# Patient Record
Sex: MALE | Race: BLACK OR AFRICAN AMERICAN | NOT HISPANIC OR LATINO | URBAN - METROPOLITAN AREA
[De-identification: names, ages, dates, MRNs, and addresses within clinical notes are randomized per-mention and may not be internally consistent; named-entity substitution may affect disease eponyms.]

---

## 2018-12-12 ENCOUNTER — AMBULATORY - HEALTHEAST (OUTPATIENT)
Dept: SLEEP MEDICINE | Facility: CLINIC | Age: 48
End: 2018-12-12

## 2018-12-12 DIAGNOSIS — G47.33 OSA (OBSTRUCTIVE SLEEP APNEA): ICD-10-CM

## 2019-03-21 ENCOUNTER — COMMUNICATION - HEALTHEAST (OUTPATIENT)
Dept: SLEEP MEDICINE | Facility: CLINIC | Age: 49
End: 2019-03-21

## 2019-03-21 ENCOUNTER — OFFICE VISIT - HEALTHEAST (OUTPATIENT)
Dept: SLEEP MEDICINE | Facility: CLINIC | Age: 49
End: 2019-03-21

## 2019-03-21 DIAGNOSIS — R29.818 SUSPECTED SLEEP APNEA: ICD-10-CM

## 2019-03-21 DIAGNOSIS — R06.83 SNORING: ICD-10-CM

## 2019-03-21 DIAGNOSIS — G47.10 HYPERSOMNIA: ICD-10-CM

## 2019-03-21 DIAGNOSIS — G47.8 SLEEP DYSFUNCTION WITH SLEEP STAGE DISTURBANCE: ICD-10-CM

## 2019-03-21 RX ORDER — MINOCYCLINE HYDROCHLORIDE 100 MG/1
100 CAPSULE ORAL 2 TIMES DAILY
Status: SHIPPED | COMMUNITY
Start: 2019-03-21

## 2019-03-21 RX ORDER — ATORVASTATIN CALCIUM 20 MG/1
20 TABLET, FILM COATED ORAL AT BEDTIME
Status: SHIPPED | COMMUNITY
Start: 2019-03-21

## 2019-03-21 ASSESSMENT — MIFFLIN-ST. JEOR: SCORE: 1942.16

## 2019-04-03 ENCOUNTER — AMBULATORY - HEALTHEAST (OUTPATIENT)
Dept: SLEEP MEDICINE | Facility: CLINIC | Age: 49
End: 2019-04-03

## 2019-04-04 ENCOUNTER — COMMUNICATION - HEALTHEAST (OUTPATIENT)
Dept: SLEEP MEDICINE | Facility: CLINIC | Age: 49
End: 2019-04-04

## 2019-04-04 DIAGNOSIS — G47.10 HYPERSOMNIA: ICD-10-CM

## 2019-04-04 DIAGNOSIS — G47.33 OBSTRUCTIVE SLEEP APNEA: ICD-10-CM

## 2019-06-05 ENCOUNTER — AMBULATORY - HEALTHEAST (OUTPATIENT)
Dept: SLEEP MEDICINE | Facility: CLINIC | Age: 49
End: 2019-06-05

## 2021-05-27 NOTE — TELEPHONE ENCOUNTER
I spoke to [facility] today. They will put a rush on these sleep study records and get them to us ASAP.

## 2021-05-27 NOTE — TELEPHONE ENCOUNTER
I spoke to [facility] department today. They state that they do not have any graphs to go along with interpretation. The records they sent over are the only records that they have in chart.

## 2021-05-27 NOTE — TELEPHONE ENCOUNTER
Please review the patient's records to see if it is sufficient to manuel the patient supplies. Thank you.

## 2021-05-27 NOTE — TELEPHONE ENCOUNTER
We were able to obtain the patient's original sleep study that was performed on 04/01/14 from [facility] [employer].  The patient's apnea hypopnea index was grossly elevated at 72.2 events per hour with the lowest O2 sat of 69%    Please call the patient scheduled for pickup of his CPAP machine.  The prescription is in the chart.  Thank you.

## 2021-05-27 NOTE — TELEPHONE ENCOUNTER
I looked through the notes that were faxed over today from [facility].  Even though they fax over progress note stating that he has obstructive sleep apnea.  The sleep study report and interpretation are not in this group.  Please contact [facility] again and ask specifically for the sleep study interpretation as well as report.  Thank you.

## 2021-05-27 NOTE — TELEPHONE ENCOUNTER
Sleep study records received today: 3/29/2019    From: [facility]    They have been sent to scan into patients chart, and a copy has been given to:  for review.

## 2021-05-27 NOTE — PROGRESS NOTES
Sleep study records received today: 4/3/2019    From: [facility]    They have been sent to scan into patients chart, and a copy has been given to: Dr. Cabrera  for review.

## 2021-05-29 NOTE — PROGRESS NOTES
Patient was offered choice of vendor and chose [facility].  Patient Alfredo Du was set up at [facility] on 6/5/19. Patient received a Resmed [id number] Auto. Pressures were set at 11-16.   Patient s ramp is 6 cm H2O for off and FLEX/EPR is EPR 2.  Patient received a REsmed Mask name: Airfit F20  FFM Size med, heated tubing and heated humidifier.    Pacee Her

## 2021-06-02 VITALS — HEIGHT: 67 IN | WEIGHT: 251 LBS | BODY MASS INDEX: 39.39 KG/M2

## 2021-06-25 NOTE — TELEPHONE ENCOUNTER
[facility]    Requesting sleep study records to include graphs and interpretations.      Fax number: [phone]    Confirmation received verifying successful.     Awaiting records

## 2021-06-25 NOTE — PATIENT INSTRUCTIONS - HE
Please call us to find out how soon we will be able to obtain the sleep study.    Equipment Instructions    We will process your PAP order and send it to a Durable Medical Equipment (DME) provider.    The medical equipment company should call you within 7 days.  If you have not heard from the company, please contact them to see if they received your order and are planning to call you.    Please call us at [phone] if you are unable to contact the medical equipment company or if they do not have the order.    If you are starting a new PAP machine, please call us after you use it the first night to let us know how it went. This call also helps us know that you received your equipment and that everything is ready. Please use our central phone number [phone]    Contact information for [employer] company:    -[employer] Tel: [phone]

## 2021-06-25 NOTE — PROGRESS NOTES
Dear Kinza Espinal Md  [address]  [address]  [place] [address]    Thank you for the opportunity to participate in the care of Mr. Alfredo Du.    He is a 49 y.o. male who comes to the clinic with a chief complaint of excessive daytime sleepiness that is been going on for more than 5 years.  He states that he was diagnosed with obstructive sleep apnea from [facility] approximately 5 years ago.  He was given a CPAP machine and was doing well on it.  However due to the fact that he was moving and recent incarceration, he lost his CPAP machine.  Patient was under the impression that he would be able to get a CPAP machine during this clinic visit.  After I informed him the process that we will need to follow through before he can get his CPAP machine, the patient was very upset with me, and wanted to leave.  I managed to de-escalate the situation and informed patient that we would do our best to try to obtain the original sleep study as soon as we can include release of information.  I also encouraged the patient to try to contact [facility] to obtain the sleep study himself.  Once we have the sleep study and hand I will write him a prescription to get a new CPAP machine.  Without CPAP therapy he reports that he still suffers from excessive daytime sleepiness, witnessed apnea as well as loud snoring.  Patient's review of systems is otherwise negative.     Ideal Sleep-Wake Cycle(devoid of societal pressure):    Patient would try to initiate sleep at around 1 AM with a sleep latency of 5 minutes. The patient would have variable awakenings. Final wake up time is around 6 AM.    Past Medical History  Past Medical History:   Diagnosis Date     Atrial flutter (H)      Chronic back pain      Hypertension      MIKE (obstructive sleep apnea)      Warfarin anticoagulation         Past Surgical History  No past surgical history on file.     Meds  Current Outpatient Medications   Medication Sig Dispense  Refill     amitriptyline (ELAVIL) 25 MG tablet Take 1 tablet by mouth daily.       aspirin 325 MG tablet Take 1 tablet by mouth daily.       atorvastatin (LIPITOR) 20 MG tablet Take 20 mg by mouth at bedtime.       DAILY-MARCIA tablet Take 1 tablet by mouth daily.       FLUoxetine (PROZAC) 20 MG capsule Take 20 mg by mouth daily.       fluticasone (FLONASE) 50 mcg/actuation nasal spray 2 sprays into each nostril daily.       GABAPENTIN ORAL Take by mouth.       hydrochlorothiazide (HYDRODIURIL) 25 MG tablet Take 25 mg by mouth daily.        ibuprofen (ADVIL,MOTRIN) 600 MG tablet as needed.        metoprolol (LOPRESSOR) 50 MG tablet Take 50 mg by mouth 2 (two) times a day.        minocycline (MINOCIN,DYNACIN) 100 MG capsule Take 100 mg by mouth 2 (two) times a day.       omeprazole (PRILOSEC) 20 MG capsule Take 1 capsule by mouth 2 (two) times a day.       traMADol (ULTRAM) 50 mg tablet Take 1 tablet (50 mg total) by mouth every 6 (six) hours as needed for pain. 10 tablet 0     No current facility-administered medications for this visit.         Allergies  Iodinated contrast- oral and iv dye     Social History  Social History     Socioeconomic History     Marital status:      Spouse name: Not on file     Number of children: Not on file     Years of education: Not on file     Highest education level: Not on file   Occupational History     Not on file   Social Needs     Financial resource strain: Not on file     Food insecurity:     Worry: Not on file     Inability: Not on file     Transportation needs:     Medical: Not on file     Non-medical: Not on file   Tobacco Use     Smoking status: Former Smoker     Smokeless tobacco: Never Used   Substance and Sexual Activity     Alcohol use: No     Drug use: No     Sexual activity: Not on file   Lifestyle     Physical activity:     Days per week: Not on file     Minutes per session: Not on file     Stress: Not on file   Relationships     Social connections:     Talks on  phone: Not on file     Gets together: Not on file     Attends Restorationism service: Not on file     Active member of club or organization: Not on file     Attends meetings of clubs or organizations: Not on file     Relationship status: Not on file     Intimate partner violence:     Fear of current or ex partner: Not on file     Emotionally abused: Not on file     Physically abused: Not on file     Forced sexual activity: Not on file   Other Topics Concern     Not on file   Social History Narrative     Not on file        Family History  No family history on file.     Review of Systems:  Constitutional: Negative except as noted in HPI.   Eyes: Negative except as noted in HPI.   ENT: Negative except as noted in HPI.   Cardiovascular: Negative except as noted in HPI.   Respiratory: Negative except as noted in HPI.   Gastrointestinal: Negative except as noted in HPI.   Genitourinary: Negative except as noted in HPI.   Musculoskeletal: Negative except as noted in HPI.   Integumentary: Negative except as noted in HPI.   Neurological: Negative except as noted in HPI.   Psychiatric: Negative except as noted in HPI.   Endocrine: Negative except as noted in HPI.   Hematologic/Lymphatic: Negative except as noted in HPI.      STOP BANG 3/21/2019   Do you snore loudly (louder than talking or loud enough to be heard through closed doors)? 1   Do you often feel tired, fatigued, or sleepy during daytime? 1   Has anyone observed you stop breathing in your sleep? 1   Do you have or are you being treated for high blood pressure? 1   BMI more than 35 kg/m2 1   Age over 50 years old? 1   Neck circumference greater than 16 inches? 1   Gender male? 1   Total Score 8     Epworths Sleepiness Scale 3/21/2019   Sitting and reading 2   Watching TV 3   Sitting, inactive in a public place (e.g. a theatre or a meeting) 3   As a passenger in a car for an hour without a break 3   Lying down to rest in the afternoon when circumstances permit 3   Sitting  "and talking to someone 2   Sitting quietly after a lunch without alcohol 3   In a car, while stopped for a few minutes in traffic 1   Total score 20     Rooming 3/21/2019   Sleep Latency anytime: quick   Wake Up Time 6am   Weekends varies   Energy Drinks 0   Coffee 0   Cola 0   Difficulty falling asleep No   Difficulty staying asleep Yes   Excessive daytime tiredness Yes   Excessive daytime sleepiness Yes   Dozing off while driving No   Shift Worker No   Sleep Walking? No   Sleep Talking? Yes   Kicking or punching? No   Restless legs symptoms Yes       Physical Exam:  /78   Pulse 62   Ht 5' 7\" (1.702 m)   Wt (!) 251 lb (113.9 kg)   SpO2 97%   BMI 39.31 kg/m    BMI:Body mass index is 39.31 kg/m .   GEN: NAD, obese  Head: Normocephalic.  EYES: PERRLA, EOMI  ENT: Oropharynx is clear, mallampatti class 4+ airway.   Nasal mucosa is moist without erythema  Neck : Thyroid is within normal limits.  Neck circumference 19.25 inches  CV: Regular rate and rhythm, S1 & S2 positive.  LUNGS: Bilateral breathsounds heard.   ABDOMEN: Positive bowel sounds in all quadrants, soft, no rebound or guarding  MUSCULOSKELETAL: Bilateral trace leg swelling  SKIN: warm, dry, no rashes  Neurological: Alert, oriented to time, place, and person.  Psych: normal mood, normal affect     Labs/Studies:     Lab Results   Component Value Date    WBC 5.6 07/18/2014    HGB 15.5 07/18/2014    HCT 47.3 07/18/2014    MCV 84 07/18/2014     07/18/2014         Chemistry        Component Value Date/Time     07/18/2014 2145    K 4.3 07/18/2014 2145     07/18/2014 2145    CO2 29 07/18/2014 2145    BUN 14 07/18/2014 2145    CREATININE 0.96 07/18/2014 2145     07/18/2014 2145        Component Value Date/Time    CALCIUM 10.0 07/18/2014 2145    ALKPHOS 107 07/18/2014 2145    AST 22 07/18/2014 2145    ALT 32 07/18/2014 2145    BILITOT 0.3 07/18/2014 2145            No results found for: FERRITIN  No results found for: TSH    "   Assessment and Plan:  In summary Alfredo Du is a 49 y.o. year old male here for sleep disturbance.  1.  Suspected sleep apnea  We will try to obtain the patient's original sleep study via release of information.  The patient is also encouraged to try to find the original sleep study himself and give us a copy.  Once we have a sleep study in the chart documenting the diagnosis of obstructive sleep apnea, I would be happy to write a prescription for the patient obtained CPAP therapy.  The patient wishes to use [employer] as the durable medical equipment company.  2.  Hypersomnia  3.  Snoring  4.  Other sleep disturbance    Patient verbalized understanding of these issues, agrees with the plan and all questions were answered today. Patient was given an opportuntity to voice any other symptoms or concerns not listed above. Patient did not have any other symptoms or concerns.       Ady Cabrera DO  Board Certified in Internal Medicine and Sleep Medicine  [facility].    (Note created with Dragon voice recognition and unintended spelling errors and word substitutions may occur)

## 2022-05-24 DIAGNOSIS — G47.33 OSA (OBSTRUCTIVE SLEEP APNEA): Primary | ICD-10-CM

## 2022-08-11 ENCOUNTER — OFFICE VISIT (OUTPATIENT)
Dept: SLEEP MEDICINE | Facility: CLINIC | Age: 52
End: 2022-08-11
Payer: COMMERCIAL

## 2022-08-11 VITALS
BODY MASS INDEX: 38.45 KG/M2 | OXYGEN SATURATION: 98 % | DIASTOLIC BLOOD PRESSURE: 78 MMHG | HEART RATE: 69 BPM | HEIGHT: 67 IN | WEIGHT: 245 LBS | SYSTOLIC BLOOD PRESSURE: 130 MMHG

## 2022-08-11 DIAGNOSIS — G47.33 OSA (OBSTRUCTIVE SLEEP APNEA): Primary | ICD-10-CM

## 2022-08-11 DIAGNOSIS — E66.9 OBESITY (BMI 30-39.9): ICD-10-CM

## 2022-08-11 PROCEDURE — 99203 OFFICE O/P NEW LOW 30 MIN: CPT | Performed by: NURSE PRACTITIONER

## 2022-08-11 RX ORDER — METFORMIN HCL 500 MG
500 TABLET, EXTENDED RELEASE 24 HR ORAL 2 TIMES DAILY WITH MEALS
COMMUNITY

## 2022-08-11 RX ORDER — MINOCYCLINE HYDROCHLORIDE 100 MG/1
100 CAPSULE ORAL
COMMUNITY
End: 2024-06-24

## 2022-08-11 RX ORDER — ATORVASTATIN CALCIUM 20 MG/1
20 TABLET, FILM COATED ORAL
COMMUNITY
End: 2024-06-24

## 2022-08-11 RX ORDER — METOPROLOL TARTRATE 50 MG
50 TABLET ORAL
COMMUNITY
End: 2024-06-22

## 2022-08-11 ASSESSMENT — SLEEP AND FATIGUE QUESTIONNAIRES
HOW LIKELY ARE YOU TO NOD OFF OR FALL ASLEEP WHILE SITTING AND TALKING TO SOMEONE: MODERATE CHANCE OF DOZING
HOW LIKELY ARE YOU TO NOD OFF OR FALL ASLEEP WHILE LYING DOWN TO REST IN THE AFTERNOON WHEN CIRCUMSTANCES PERMIT: HIGH CHANCE OF DOZING
HOW LIKELY ARE YOU TO NOD OFF OR FALL ASLEEP WHILE SITTING AND READING: HIGH CHANCE OF DOZING
HOW LIKELY ARE YOU TO NOD OFF OR FALL ASLEEP WHILE WATCHING TV: HIGH CHANCE OF DOZING
HOW LIKELY ARE YOU TO NOD OFF OR FALL ASLEEP WHEN YOU ARE A PASSENGER IN A CAR FOR AN HOUR WITHOUT A BREAK: HIGH CHANCE OF DOZING
HOW LIKELY ARE YOU TO NOD OFF OR FALL ASLEEP IN A CAR, WHILE STOPPED FOR A FEW MINUTES IN TRAFFIC: MODERATE CHANCE OF DOZING
HOW LIKELY ARE YOU TO NOD OFF OR FALL ASLEEP WHILE SITTING INACTIVE IN A PUBLIC PLACE: HIGH CHANCE OF DOZING
HOW LIKELY ARE YOU TO NOD OFF OR FALL ASLEEP WHILE SITTING QUIETLY AFTER LUNCH WITHOUT ALCOHOL: HIGH CHANCE OF DOZING

## 2022-08-11 NOTE — PATIENT INSTRUCTIONS
Drive Safe... Drive Alive     Sleep health profoundly affects your health, mood, and your safety. 33% of the population (one in three of us) is not getting enough sleep and many have a sleep disorder. Not getting enough sleep or having an untreated / undertreated sleep condition may make us sleepy without even knowing it. In fact, our driving could be dramatically impaired due to our sleep health. As your provider, here are some things I would like you to know about driving:     Here are some warning signs for impairment and dangerous drowsy driving:              -Having been awake more than 16 hours               -Looking tired               -Eyelid drooping              -Head nodding (it could be too late at this point)              -Driving for more than 30 minutes     Some things you could do to make the driving safer if you are experiencing some drowsiness:              -Stop driving and rest              -Call for transportation              -Make sure your sleep disorder is adequately treated     Some things that have been shown NOT to work when experiencing drowsiness while driving:              -Turning on the radio              -Opening windows              -Eating any  distracting  /  entertaining  foods (e.g., sunflower seeds, candy, or any other)              -Talking on the phone      Your decision may not only impact your life, but also the life of others. Please, remember to drive safe for yourself and all of us.       MY INFORMATION ON SLEEP APNEA-  Alfredo Du    DOCTOR : SAPPHIRE Gomez CNP  SLEEP CENTER :      MY CONTACT NUMBER:   [facility]  [phone]  [facility]   [phone]  [facility]   [phone]      [facility]  [phone]  [facility]   [phone]    [facility] [place] [address], [address]  [place], [place]  "[id number]  Phone: [phone]    Hours:  Mon - Fri: 8:00 a.m. - 4:30 p.m.  Sat: Closed  Sun: Closed      Key Points:  1. What is Obstructive Sleep Apnea (MIKE)? MIKE is the most common type of sleep apnea. Apnea literally means, \"without breath.\" It is characterized by repetitive pauses in breathing, despite continued effort to breathe, and is usually associated with a reduction in blood oxygen saturation. Apneas can last 10 to over 60 seconds. It is caused by narrowing or collapse of the upper airway as muscles relax during sleep.   2. What are the consequences of MIKE? Symptoms include: daytime sleepiness- possibly increasing the risk of falling asleep while driving, unrefreshing/restless sleep, snoring, insomnia, waking frequently to urinate, waking with heartburn or reflux, reduced concentration and memory, and morning headaches. Other health consequences may include development of high blood pressure. Untreated MIKE also can contribute to heart disease, stroke and diabetes.   3. What are the treatment options? In most situations, sleep apnea is a lifelong disease that must be managed with daily therapy. Continuous Positive Airway (CPAP) is the most reliable treatment. A mouthguard to hold your jaw forward is usually the next most reliable option. Other options include postioning devices (to keep you off your back), nasal valves, tongue retaining device, weight loss, surgery. There is more detail about these options toward the end of this document.  4. What are the most important things to remember about using CPAP?     WHERE CAN I FIND MORE INFORMATION?    American Academy of Sleep Medicine Patient information on sleep disorders:  http://yoursleep.aasmnet.org    CPAP -  WHY AND HOW?                 Continuous positive airway pressure, or CPAP, is the most effective treatment for obstructive sleep apnea. It works by blowing room air, through a mask, to hold your throat open. A decision to use CPAP is a major step " "forward in the pursuit of a healthier life. The successful use of CPAP will help you breathe easier, sleep better and live healthier. Using CPAP can be a positive experience if you keep these eduardo points in mind:  Commitment  CPAP is not a quick fix for your problem. It involves a long-term commitment to improve your sleep and your health.    Communication  Stay in close communication with both your sleep doctor and your CPAP supplier. Ask lots of questions and seek help when you need it.    Consistency  Use CPAP all night, every night and for every nap. You will receive the maximum health benefits from CPAP when you use it every time that you sleep. This will also make it easier for your body to adjust to the treatment.    Correction  The first machine and mask that you try may not be the best ones for you. Work with your sleep doctor and your CPAP supplier to make corrections to your equipment selection. Ask about trying a different type of machine or mask if you have ongoing problems. Make sure that your mask is a good fit and learn to use your equipment properly.    Challenge  Tell a family member or close friend to ask you each morning if you used your CPAP the previous night. Have someone to challenge you to give it your best effort.    Connection   Your adjustment to CPAP will be easier if you are able to connect with others who use the same treatment. Ask your sleep doctor if there is a support group in your area for people who have sleep apnea, or look for one on the Internet.  Comfort   Increase your level of comfort by using a saline spray, decongestant or heated humidifier if CPAP irritates your nose, mouth or throat. Use your unit's \"ramp\" setting to slowly get used to the air pressure level. There may be soft pads you can buy that will fit over your mask straps. Look on www.CPAP.com for accessories that can help make CPAP use more comfortable.  Cleaning   Clean your mask, tubing and headgear on a " regular basis. Put this time in your schedule so that you don't forget to do it. Check and replace the filters for your CPAP unit and humidifier.    Completion   Although you are never finished with CPAP therapy, you should reward yourself by celebrating the completion of your first month of treatment. Expect this first month to be your hardest period of adjustment. It will involve some trial and error as you find the machine, mask and pressure settings that are right for you.    Continuation  After your first month of treatment, continue to make a daily commitment to use your CPAP all night, every night and for every nap.    CPAP-Tips to starting with success:  Begin using your CPAP for short periods of time during the day while you watch TV or read.    Use CPAP every night and for every nap. Using it less often reduces the health benefits and makes it harder for your body to get used to it.    Newer CPAP models are virtually silent; however, if you find the sound of your CPAP machine to be bothersome, place the unit under your bed to dampen the sound.     Make small adjustments to your mask, tubing, straps and headgear until you get the right fit. Tightening the mask may actually worsen the leak.  If it leaves significant marks on your face or irritates the bridge of your nose, it may not be the best mask for you.  Speak with the person who supplied the mask and consider trying other masks. Insurances will allow you to try different masks during the first month of starting CPAP.  Insurance also covers a new mask, hose and filter about every 6 months.    Use a saline nasal spray to ease mild nasal congestion. Neti-Pot or saline nasal rinses may also help. Nasal gel sprays can help reduce nasal dryness.  Biotene mouthwash can be helpful to protect your teeth if you experience frequent dry mouth.  Dry mouth may be a sign of air escaping out of your mouth or out of the mask in the case of a full face mask.  Speak with  your provider if you expect that is the case.     Take a nasal decongestant to relieve more severe nasal or sinus congestion.  Do not use Afrin (oxymetazoline) nasal spray more than 3 days in a row.  Speak with your sleep doctor if your nasal congestion is chronic.    Use a heated humidifier that fits your CPAP model to enhance your breathing comfort. Adjust the heat setting up if you get a dry nose or throat, down if you get condensation in the hose or mask.  Position the CPAP lower than you so that any condensation in the hose drains back into the machine rather than towards the mask.    Try a system that uses nasal pillows if traditional masks give you problems.    Clean your mask, tubing and headgear once a week. Make sure the equipment dries fully.    Regularly check and replace the filters for your CPAP unit and humidifier.    Work closely with your sleep provider and your CPAP supplier to make sure that you have the machine, mask and air pressure setting that works best for you. It is better to stop using it and call your provider to solve problems than to lay awake all night frustrated with the device.    Weight Loss:    Weight loss decreases severity of sleep apnea in most people with obesity. For those with mild obesity who have developed snoring with weight gain, even 15-30 pound weight loss can improve and occasionally eliminate sleep apnea.  Structured and life-long dietary and health habits are necessary to lose weight and keep healthier weight levels.     Though there are significant health benefits from weight loss, long-term weight loss is very difficult to achieve- studies show success with dietary management in less than 10% of people. In addition, substantial weight loss may require years of dietary control and may be difficult if patients have severe obesity. In these cases, surgical management may be considered.    If you are interested in methods for weight loss, you should review the options  discussed at the National Institutes of Health patient information sites:     http://win.niddk.nih.gov/publications/index.htm  http:/www.health.nih.gov/topic/WeightLossDieting    Bariatric programs offer counseling in all methods of weight loss:    Http:/www.[email or web address]/WeightLossSurgeryandMedicalMgmt/htm    Your BMI is Body mass index is 38.37 kg/m .    Weight management plan: Patient was referred to their PCP to discuss a diet and exercise plan.    Body mass index (BMI) is one way to tell whether you are at a healthy weight, overweight, or obese. It measures your weight in relation to your height.  A BMI of 18.5 to 24.9 is in the healthy range. A person with a BMI of 25 to 29.9 is considered overweight, and someone with a BMI of 30 or greater is considered obese.  Another way to find out if you are at risk for health problems caused by overweight and obesity is to measure your waist. If you are a woman and your waist is more than 35 inches, or if you are a man and your waist is more than 40 inches, your risk of disease may be higher.  More than two-thirds of [place] adults are considered overweight or obese. Being overweight or obese increases the risk for further weight gain.  Excess weight may lead to heart disease and diabetes. Creating and following plans for healthy eating and physical activity may help you improve your health.    Methods for maintaining or losing weight.    Weight control is part of healthy lifestyle and includes exercise, emotional health, and healthy eating habits.  Careful eating habits lifelong is the mainstay of weight control.  Though there are significant health benefits from weight loss, long-term weight loss with diet alone may be very difficult to achieve- studies show long-term success with dietary management in less than 10% of people. Attaining a healthy weight may be especially difficult to achieve in those with severe obesity. In some cases, medications,  devices and surgical management might be considered.    What can you do?    If you are overweight or obese and are interested in methods for weight loss, you should discuss this with your provider. In addition, we recommend that you review healthy life styles and methods for weight loss available through the National Institutes of Health patient information sites:     http://win.niddk.nih.gov/publications/index.htm
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
## 2022-08-11 NOTE — NURSING NOTE
Please adjust APAP to 14-20 cm today in clinic     Comprehensive DME order placed for pressure change and FFM and supplies. Please see if pt can get into [facility] for mask/supplies ASAP.     ENCOURAGE INCREASED USAGE ON CPAP!     Follow up 3 months to review APAP download and use/compliance.    Genaro Juarez, Visit facilitator

## 2024-06-22 ENCOUNTER — HOSPITAL ENCOUNTER (EMERGENCY)
Facility: HOSPITAL | Age: 54
Discharge: HOME | End: 2024-06-22
Attending: EMERGENCY MEDICINE | Admitting: EMERGENCY MEDICINE
Payer: COMMERCIAL

## 2024-06-22 ENCOUNTER — APPOINTMENT (OUTPATIENT)
Dept: CT IMAGING | Facility: HOSPITAL | Age: 54
End: 2024-06-22
Attending: EMERGENCY MEDICINE
Payer: COMMERCIAL

## 2024-06-22 VITALS
DIASTOLIC BLOOD PRESSURE: 73 MMHG | RESPIRATION RATE: 13 BRPM | HEART RATE: 117 BPM | TEMPERATURE: 97.2 F | WEIGHT: 247 LBS | SYSTOLIC BLOOD PRESSURE: 113 MMHG | BODY MASS INDEX: 38.77 KG/M2 | OXYGEN SATURATION: 99 % | HEIGHT: 67 IN

## 2024-06-22 DIAGNOSIS — I48.91 ATRIAL FIBRILLATION WITH RAPID VENTRICULAR RESPONSE (H): ICD-10-CM

## 2024-06-22 DIAGNOSIS — R10.9 LEFT FLANK PAIN: ICD-10-CM

## 2024-06-22 LAB
ALBUMIN UR-MCNC: 100 MG/DL
AMORPH CRY #/AREA URNS HPF: ABNORMAL /HPF
ANION GAP SERPL CALCULATED.3IONS-SCNC: 13 MMOL/L (ref 7–15)
APPEARANCE UR: ABNORMAL
BASOPHILS # BLD AUTO: 0 10E3/UL (ref 0–0.2)
BASOPHILS NFR BLD AUTO: 1 %
BILIRUB UR QL STRIP: NEGATIVE
BUN SERPL-MCNC: 11.9 MG/DL (ref 6–20)
CALCIUM SERPL-MCNC: 9.1 MG/DL (ref 8.6–10)
CHLORIDE SERPL-SCNC: 97 MMOL/L (ref 98–107)
COLOR UR AUTO: YELLOW
CREAT SERPL-MCNC: 0.98 MG/DL (ref 0.67–1.17)
DEPRECATED HCO3 PLAS-SCNC: 30 MMOL/L (ref 22–29)
EGFRCR SERPLBLD CKD-EPI 2021: >90 ML/MIN/1.73M2
EOSINOPHIL # BLD AUTO: 0 10E3/UL (ref 0–0.7)
EOSINOPHIL NFR BLD AUTO: 1 %
ERYTHROCYTE [DISTWIDTH] IN BLOOD BY AUTOMATED COUNT: 12.4 % (ref 10–15)
GLUCOSE SERPL-MCNC: 195 MG/DL (ref 70–99)
GLUCOSE UR STRIP-MCNC: NEGATIVE MG/DL
HCT VFR BLD AUTO: 50.1 % (ref 40–53)
HGB BLD-MCNC: 16.7 G/DL (ref 13.3–17.7)
HGB UR QL STRIP: NEGATIVE
IMM GRANULOCYTES # BLD: 0 10E3/UL
IMM GRANULOCYTES NFR BLD: 0 %
KETONES UR STRIP-MCNC: 20 MG/DL
LEUKOCYTE ESTERASE UR QL STRIP: NEGATIVE
LYMPHOCYTES # BLD AUTO: 2.2 10E3/UL (ref 0.8–5.3)
LYMPHOCYTES NFR BLD AUTO: 44 %
MCH RBC QN AUTO: 27.3 PG (ref 26.5–33)
MCHC RBC AUTO-ENTMCNC: 33.3 G/DL (ref 31.5–36.5)
MCV RBC AUTO: 82 FL (ref 78–100)
MONOCYTES # BLD AUTO: 0.5 10E3/UL (ref 0–1.3)
MONOCYTES NFR BLD AUTO: 10 %
MUCOUS THREADS #/AREA URNS LPF: PRESENT /LPF
NEUTROPHILS # BLD AUTO: 2.2 10E3/UL (ref 1.6–8.3)
NEUTROPHILS NFR BLD AUTO: 44 %
NITRATE UR QL: NEGATIVE
NRBC # BLD AUTO: 0 10E3/UL
NRBC BLD AUTO-RTO: 0 /100
PH UR STRIP: 8.5 [PH] (ref 5–7)
PLATELET # BLD AUTO: 230 10E3/UL (ref 150–450)
POTASSIUM SERPL-SCNC: 3.9 MMOL/L (ref 3.4–5.3)
RBC # BLD AUTO: 6.11 10E6/UL (ref 4.4–5.9)
RBC URINE: 1 /HPF
SODIUM SERPL-SCNC: 140 MMOL/L (ref 135–145)
SP GR UR STRIP: 1.02 (ref 1–1.03)
SQUAMOUS EPITHELIAL: 1 /HPF
UROBILINOGEN UR STRIP-MCNC: 2 MG/DL
WBC # BLD AUTO: 5.1 10E3/UL (ref 4–11)
WBC URINE: 3 /HPF

## 2024-06-22 PROCEDURE — 80048 BASIC METABOLIC PNL TOTAL CA: CPT | Performed by: EMERGENCY MEDICINE

## 2024-06-22 PROCEDURE — 93005 ELECTROCARDIOGRAM TRACING: CPT | Performed by: EMERGENCY MEDICINE

## 2024-06-22 PROCEDURE — 250N000013 HC RX MED GY IP 250 OP 250 PS 637: Performed by: EMERGENCY MEDICINE

## 2024-06-22 PROCEDURE — 81001 URINALYSIS AUTO W/SCOPE: CPT | Performed by: EMERGENCY MEDICINE

## 2024-06-22 PROCEDURE — 96375 TX/PRO/DX INJ NEW DRUG ADDON: CPT

## 2024-06-22 PROCEDURE — 36415 COLL VENOUS BLD VENIPUNCTURE: CPT | Performed by: EMERGENCY MEDICINE

## 2024-06-22 PROCEDURE — 250N000011 HC RX IP 250 OP 636: Mod: JZ | Performed by: EMERGENCY MEDICINE

## 2024-06-22 PROCEDURE — 74176 CT ABD & PELVIS W/O CONTRAST: CPT

## 2024-06-22 PROCEDURE — 99285 EMERGENCY DEPT VISIT HI MDM: CPT | Mod: 25

## 2024-06-22 PROCEDURE — 85025 COMPLETE CBC W/AUTO DIFF WBC: CPT | Performed by: EMERGENCY MEDICINE

## 2024-06-22 PROCEDURE — 96374 THER/PROPH/DIAG INJ IV PUSH: CPT

## 2024-06-22 RX ORDER — ACETAMINOPHEN 325 MG/1
975 TABLET ORAL ONCE
Status: COMPLETED | OUTPATIENT
Start: 2024-06-22 | End: 2024-06-22

## 2024-06-22 RX ORDER — KETOROLAC TROMETHAMINE 30 MG/ML
30 INJECTION, SOLUTION INTRAMUSCULAR; INTRAVENOUS ONCE
Status: COMPLETED | OUTPATIENT
Start: 2024-06-22 | End: 2024-06-22

## 2024-06-22 RX ORDER — METOPROLOL TARTRATE 100 MG
100 TABLET ORAL 2 TIMES DAILY
Qty: 60 TABLET | Refills: 0 | Status: SHIPPED | OUTPATIENT
Start: 2024-06-22 | End: 2024-07-22

## 2024-06-22 RX ORDER — ONDANSETRON 2 MG/ML
4 INJECTION INTRAMUSCULAR; INTRAVENOUS ONCE
Status: COMPLETED | OUTPATIENT
Start: 2024-06-22 | End: 2024-06-22

## 2024-06-22 RX ORDER — METOPROLOL TARTRATE 25 MG/1
100 TABLET, FILM COATED ORAL ONCE
Status: COMPLETED | OUTPATIENT
Start: 2024-06-22 | End: 2024-06-22

## 2024-06-22 RX ADMIN — Medication 50 MG: at 11:36

## 2024-06-22 RX ADMIN — KETOROLAC TROMETHAMINE 30 MG: 30 INJECTION, SOLUTION INTRAMUSCULAR at 11:39

## 2024-06-22 RX ADMIN — ACETAMINOPHEN 975 MG: 325 TABLET ORAL at 14:04

## 2024-06-22 RX ADMIN — ONDANSETRON 4 MG: 2 INJECTION INTRAMUSCULAR; INTRAVENOUS at 11:37

## 2024-06-22 RX ADMIN — METOPROLOL TARTRATE 100 MG: 25 TABLET, FILM COATED ORAL at 14:03

## 2024-06-22 ASSESSMENT — ACTIVITIES OF DAILY LIVING (ADL)
ADLS_ACUITY_SCORE: 35

## 2024-06-22 ASSESSMENT — CHADS2 SCORE
CHADS2 SCORE: 2
PRIOR STROKE OR TIA OR THROMBOEMBOLISM: NO
AGE GREATER THAN OR EQUAL TO 75: NO
DIABETES: YES
HYPERTENSION: YES
CHF: NO

## 2024-06-22 ASSESSMENT — CHA2DS2 SCORE
SEX: MALE
AGE IN YEARS: <65

## 2024-06-22 ASSESSMENT — COLUMBIA-SUICIDE SEVERITY RATING SCALE - C-SSRS
1. IN THE PAST MONTH, HAVE YOU WISHED YOU WERE DEAD OR WISHED YOU COULD GO TO SLEEP AND NOT WAKE UP?: NO
2. HAVE YOU ACTUALLY HAD ANY THOUGHTS OF KILLING YOURSELF IN THE PAST MONTH?: NO
6. HAVE YOU EVER DONE ANYTHING, STARTED TO DO ANYTHING, OR PREPARED TO DO ANYTHING TO END YOUR LIFE?: NO

## 2024-06-22 NOTE — ED NOTES
Provider aware of waxing and waning tachycardia prior to discharge; patient is asymptomatic with this. Cleared for discharge.

## 2024-06-22 NOTE — DISCHARGE INSTRUCTIONS
You were found to be in atrial fibrillation today.  Increase your metoprolol from 50 to 100 mg twice daily.  Take blood thinning medicine to decrease risk of stroke in the future.  Follow-up with cardiology, the rapid access clinic.  They should be calling you on Monday to schedule outpatient follow-up but have also given you their contact information.    With regards to the flank pain I do think that this is musculoskeletal or primary pain to the back as your urine sample and the remainder of blood work and pictures look normal.  You may use Tylenol 1000 mg 4 times daily as needed for pain.  Avoid ibuprofen given the prescribed Eliquis.  Ice or heat as well as Voltaren gel may be helpful.  Is a low likelihood that this could be an early presentation of shingles, so if you do start to develop a red, blistering rash to the area of pain I would recommend you be seen again.  Follow-up with primary care, I have given you a referral for primary care clinic and again they should be calling Monday to schedule follow-up appointment but have given you the contact information to the clinic as well.

## 2024-06-22 NOTE — Clinical Note
Alfredo Du was seen and treated in our emergency department on 6/22/2024.  He may return to work on 06/23/2024.       If you have any questions or concerns, please don't hesitate to call.      Suad Mcdermott MD

## 2024-06-22 NOTE — ED TRIAGE NOTES
The patient arrives with significant other from home, pt c/o left flank pain intense x 2 days, with reported dark colored urine. BM today, pt voids prior to arrival. Denies abdominal pain.

## 2024-06-22 NOTE — ED PROVIDER NOTES
EMERGENCY DEPARTMENT ENCOUNTER      NAME: Alfredo Du  AGE: 54 year old male  YOB: 1970  MRN: [id number]  EVALUATION DATE & TIME: 6/22/2024 11:14 AM    PCP: Shyla Milian    ED PROVIDER: Suad Mcdermott MD    Chief Complaint   Patient presents with    Flank Pain         FINAL IMPRESSION:  1. Atrial fibrillation with rapid ventricular response (H)    2. Left flank pain          ED COURSE & MEDICAL DECISION MAKING:    Pertinent Labs & Imaging studies reviewed. (See chart for details)  54 year old male with history of HTN, HLD, DM who presents to the Emergency Department for evaluation of left-sided flank pain wrapping somewhat to the left lower quadrant with darker urine and nausea x 2 days.  Tachycardic with irregularly irregular rhythm here.  Differential includes UTI, pyelonephritis, ureterolithiasis, diverticulitis.  Unlikely AAA.  With his tachycardia and irregular rhythm concern for A-fib.  It sounds like he may have had an episode of this previously, but per chart review most recent EKG shows sinus rhythm.    Patient placed on monitor, IV established and blood obtained.  Given Zofran, Toradol, Dramamine.  Given Tylenol once due.  EKG obtained showing A-fib with RVR, no ischemic changes.  CBC, BMP notable for hyperglycemia and a known diabetic.  Urinalysis does not appear infected.  CT abdomen pelvis unremarkable.  [facility] 2020 there is an EKG showing A-fib.  Patient admits that he is no longer taking the blood thinners, stating that the INR checks with the Coumadin were limiting.  Chads score is 2, agreeable to trialing Eliquis on ED discharge and with his heart rate slightly elevated will increase home metoprolol from 50 to 100 mg twice daily.  I do think that his flank pain is more musculoskeletal, but certainly could be an early presentation of shingles.  Counseled to return for evaluation should he develop a vesicular rash.  Will use Tylenol,  Voltaren gel given avoiding NSAIDs for upcoming Eliquis.  Given PCP and cardiology referral as he would like to establish care here at [facility].      ED Course as of 24 1357   Sat 2024   1112 Only previous CT abdomen pelvis is from  and at that time there were not any visualized stones   1112 Pulse(!): 129   1203 Glucose(!): 195  Known DM   1227 Pain improved        Medical Decision Making    History:  Supplemental history from: Family Member/Significant Other  External Record(s) reviewed: Prior Imagin CT abdomen pelvis    Work Up:  Chart documentation includes differential considered and any EKGs or imaging independently interpreted by provider, see MDM  In additional to work up documented, I considered the following work up: see MDM    External consultation:  Discussion of management with another provider: N/A    Complicating factors:  Care impacted by chronic illness: Diabetes, Hyperlipidemia, and Hypertension  Care affected by social determinants of health: Access to Medical Care weekend no access to PCP    Disposition considerations: Discharge. I prescribed additional prescription strength medication(s) as charted. See documentation for any additional details.        At the conclusion of the encounter I discussed the results of all of the tests and the disposition. The questions were answered. The patient or family acknowledged understanding and was agreeable with the care plan.      MEDICATIONS GIVEN IN THE EMERGENCY:  Medications   acetaminophen (TYLENOL) tablet 975 mg (has no administration in time range)   metoprolol tartrate (LOPRESSOR) tablet 100 mg (has no administration in time range)   dimenhyDRINATE chew tab 50 mg (50 mg Oral $Given 24 1136)   ketorolac (TORADOL) injection 30 mg (30 mg Intravenous $Given 24 1139)   ondansetron (ZOFRAN) injection 4 mg (4 mg Intravenous $Given 24 1137)       NEW PRESCRIPTIONS STARTED AT TODAY'S ER VISIT  New Prescriptions     APIXABAN ANTICOAGULANT (ELIQUIS ANTICOAGULANT) 5 MG TABLET    Take 1 tablet (5 mg) by mouth 2 times daily for 30 days    DICLOFENAC (VOLTAREN) 1 % TOPICAL GEL    Apply 4 g topically 4 times daily    METOPROLOL TARTRATE (LOPRESSOR) 100 MG TABLET    Take 1 tablet (100 mg) by mouth 2 times daily for 30 days          =================================================================    HPI    Patient information was obtained from: patient, wife    Use of Intrepreter: N/A      Alfredo Du is a 54 year old male with pertinent medical history of HTN, HLD, DM and possible previous episode of atrial fibrillation who presents 2 days of left-sided flank pain sharp, initially intermittent now more constant.  Associated nausea, but no vomiting.  Single looser stool today, otherwise bowel movements have been normal.  No blood.  Notes that urine is dark but not frankly bloody.  Tried Tylenol at 8 AM without improvement prompting ED visit.  Denies any known history of ureterolithiasis.      PAST MEDICAL HISTORY:  History reviewed. No pertinent past medical history.    PAST SURGICAL HISTORY:  History reviewed. No pertinent surgical history.    CURRENT MEDICATIONS:    Prior to Admission Medications   Prescriptions Last Dose Informant Patient Reported? Taking?   DAILY-MARCIA tablet   Yes No   Sig: [DAILY-MARCIA TABLET] Take 1 tablet by mouth daily.   FLUoxetine (PROZAC) 20 MG capsule   Yes No   Sig: [FLUOXETINE (PROZAC) 20 MG CAPSULE] Take 20 mg by mouth daily.   Patient not taking: Reported on 8/11/2022   GABAPENTIN ORAL   Yes No   Sig: [GABAPENTIN ORAL] Take by mouth.   Patient not taking: Reported on 8/11/2022   amitriptyline (ELAVIL) 25 MG tablet   Yes No   Sig: [AMITRIPTYLINE (ELAVIL) 25 MG TABLET] Take 1 tablet by mouth daily.   aspirin 325 MG tablet   Yes No   Sig: [ASPIRIN 325 MG TABLET] Take 1 tablet by mouth daily.   Patient not taking: Reported on 8/11/2022   atorvastatin (LIPITOR) 20 MG tablet   Yes No   Sig: [ATORVASTATIN  (LIPITOR) 20 MG TABLET] Take 20 mg by mouth at bedtime.   atorvastatin (LIPITOR) 20 MG tablet   Yes No   Sig: Take 20 mg by mouth   fluticasone (FLONASE) 50 mcg/actuation nasal spray   Yes No   Sig: [FLUTICASONE (FLONASE) 50 MCG/ACTUATION NASAL SPRAY] 2 sprays into each nostril daily.   Patient not taking: Reported on 8/11/2022   hydrochlorothiazide (HYDRODIURIL) 25 MG tablet   Yes No   Sig: [HYDROCHLOROTHIAZIDE (HYDRODIURIL) 25 MG TABLET] Take 25 mg by mouth daily.    ibuprofen (ADVIL,MOTRIN) 600 MG tablet   Yes No   Sig: [IBUPROFEN (ADVIL,MOTRIN) 600 MG TABLET] as needed.    metFORMIN (GLUCOPHAGE XR) 500 MG 24 hr tablet   Yes No   Sig: Take 500 mg by mouth   metoprolol (LOPRESSOR) 50 MG tablet   Yes No   Sig: [METOPROLOL (LOPRESSOR) 50 MG TABLET] Take 50 mg by mouth 2 (two) times a day.    metoprolol tartrate (LOPRESSOR) 50 MG tablet   Yes No   Sig: Take 50 mg by mouth   minocycline (MINOCIN) 100 MG capsule   Yes No   Sig: Take 100 mg by mouth   minocycline (MINOCIN,DYNACIN) 100 MG capsule   Yes No   Sig: [MINOCYCLINE (MINOCIN,DYNACIN) 100 MG CAPSULE] Take 100 mg by mouth 2 (two) times a day.   omeprazole (PRILOSEC) 20 MG capsule   Yes No   Sig: [OMEPRAZOLE (PRILOSEC) 20 MG CAPSULE] Take 1 capsule by mouth 2 (two) times a day.   Patient not taking: Reported on 8/11/2022   traMADol (ULTRAM) 50 mg tablet   No No   Sig: [TRAMADOL (ULTRAM) 50 MG TABLET] Take 1 tablet (50 mg total) by mouth every 6 (six) hours as needed for pain.   Patient not taking: Reported on 8/11/2022      Facility-Administered Medications: None       ALLERGIES:  Allergies   Allergen Reactions    Contrast Dye Shortness Of Breath    Iodinated Contrast Media Shortness Of Breath and Difficulty breathing       FAMILY HISTORY:  History reviewed. No pertinent family history.    SOCIAL HISTORY:  Social History     Tobacco Use    Smoking status: Former    Smokeless tobacco: Never   Substance Use Topics    Alcohol use: No    Drug use: No     "    VITALS:  Patient Vitals for the past 24 hrs:   BP Temp Temp src Pulse Resp SpO2 Height Weight   06/22/24 1330 131/57 -- -- 111 13 99 % -- --   06/22/24 1130 128/63 -- -- 116 -- 95 % -- --   06/22/24 1111 116/73 97.2  F (36.2  C) Temporal (!) 129 20 100 % 1.702 m (5' 7\") 112 kg (247 lb)       PHYSICAL EXAM    General Appearance: Well-appearing, well-nourished, no acute distress   Head:  Normocephalic  Cardio: Tachycardic in the 120s with a regular irregular rhythm  Pulm:  No respiratory distress, clear to auscultation bilaterally  Back: Left-sided CVA tenderness  Abdomen:  Soft, BS, mild left lower quadrant abdominal pain no rebound or guarding.  Easily reducible small umbilical hernia.  No palpable hernia within the inguinal region  Extremities: Normal gait  Skin:  Skin warm, dry, no rashes  Neuro:  Alert and oriented ×3     RADIOLOGY/LABS:  Reviewed all pertinent imaging. Please see official radiology report. All pertinent labs reviewed and interpreted.    Results for orders placed or performed during the hospital encounter of 06/22/24   Abd/pelvis CT no contrast - Stone Protocol    Impression    IMPRESSION:   1.  No renal or ureteral calculi. There is no evidence for hydronephrosis.  2.  No evidence for bowel obstruction or inflammation.  3.  Small fat-containing umbilical hernia.     Basic metabolic panel   Result Value Ref Range    Sodium 140 135 - 145 mmol/L    Potassium 3.9 3.4 - 5.3 mmol/L    Chloride 97 (L) 98 - 107 mmol/L    Carbon Dioxide (CO2) 30 (H) 22 - 29 mmol/L    Anion Gap 13 7 - 15 mmol/L    Urea Nitrogen 11.9 6.0 - 20.0 mg/dL    Creatinine 0.98 0.67 - 1.17 mg/dL    GFR Estimate >90 >60 mL/min/1.73m2    Calcium 9.1 8.6 - 10.0 mg/dL    Glucose 195 (H) 70 - 99 mg/dL   UA with Microscopic reflex to Culture    Specimen: Urine, Clean Catch   Result Value Ref Range    Color Urine Yellow Colorless, Straw, Light Yellow, Yellow    Appearance Urine Turbid (A) Clear    Glucose Urine Negative Negative mg/dL "    Bilirubin Urine Negative Negative    Ketones Urine 20 (A) Negative mg/dL    Specific Gravity Urine 1.025 1.001 - 1.030    Blood Urine Negative Negative    pH Urine 8.5 (H) 5.0 - 7.0    Protein Albumin Urine 100 (A) Negative mg/dL    Urobilinogen Urine 2.0 (A) <2.0 mg/dL    Nitrite Urine Negative Negative    Leukocyte Esterase Urine Negative Negative    Mucus Urine Present (A) None Seen /LPF    Amorphous Crystals Urine Few (A) None Seen /HPF    RBC Urine 1 <=2 /HPF    WBC Urine 3 <=5 /HPF    Squamous Epithelials Urine 1 <=1 /HPF   CBC with platelets and differential   Result Value Ref Range    WBC Count 5.1 4.0 - 11.0 10e3/uL    RBC Count 6.11 (H) 4.40 - 5.90 10e6/uL    Hemoglobin 16.7 13.3 - 17.7 g/dL    Hematocrit 50.1 40.0 - 53.0 %    MCV 82 78 - 100 fL    MCH 27.3 26.5 - 33.0 pg    MCHC 33.3 31.5 - 36.5 g/dL    RDW 12.4 10.0 - 15.0 %    Platelet Count 230 150 - 450 10e3/uL    % Neutrophils 44 %    % Lymphocytes 44 %    % Monocytes 10 %    % Eosinophils 1 %    % Basophils 1 %    % Immature Granulocytes 0 %    NRBCs per 100 WBC 0 <1 /100    Absolute Neutrophils 2.2 1.6 - 8.3 10e3/uL    Absolute Lymphocytes 2.2 0.8 - 5.3 10e3/uL    Absolute Monocytes 0.5 0.0 - 1.3 10e3/uL    Absolute Eosinophils 0.0 0.0 - 0.7 10e3/uL    Absolute Basophils 0.0 0.0 - 0.2 10e3/uL    Absolute Immature Granulocytes 0.0 <=0.4 10e3/uL    Absolute NRBCs 0.0 10e3/uL       EKG:  Performed at: 6/22/2024 11:39:57    Impression:   Atrial fibrillation with rapid ventricular response  Nonspecific T wave abnormality  Abnormal ECG    Rate: 112 bpm  Rhythm: Atrial fibrillation with rapid ventricular response  Axis: 37  AZ Interval: N/A  QRS Interval: 86 ms  QTc Interval: 366/499 ms  Comparison: Atrial fibrillation has replaced sinus rhythm, vent. Rate has increased by 49 bpm when compared with ECG 7/15/2014 22:18  I have independently reviewed and interpreted the EKG(s) documented above.      Suad Mcdermott MD  Emergency Medicine  [facility]  [facility] EMERGENCY DEPARTMENT  [address] [place] [id number]  [phone]  Dept: [phone]     Suad Mcdermott MD  06/22/24 6389

## 2024-06-24 ENCOUNTER — APPOINTMENT (OUTPATIENT)
Dept: RADIOLOGY | Facility: HOSPITAL | Age: 54
End: 2024-06-24
Attending: EMERGENCY MEDICINE
Payer: COMMERCIAL

## 2024-06-24 ENCOUNTER — HOSPITAL ENCOUNTER (OUTPATIENT)
Facility: HOSPITAL | Age: 54
Setting detail: OBSERVATION
Discharge: HOME | End: 2024-06-25
Attending: EMERGENCY MEDICINE | Admitting: INTERNAL MEDICINE
Payer: COMMERCIAL

## 2024-06-24 DIAGNOSIS — I48.92 ATRIAL FLUTTER WITH RAPID VENTRICULAR RESPONSE (H): ICD-10-CM

## 2024-06-24 PROBLEM — E87.6 HYPOKALEMIA: Status: ACTIVE | Noted: 2024-06-24

## 2024-06-24 PROBLEM — R31.29 MICROSCOPIC HEMATURIA: Status: ACTIVE | Noted: 2024-06-24

## 2024-06-24 LAB
ALBUMIN SERPL BCG-MCNC: 3.1 G/DL (ref 3.5–5.2)
ALBUMIN UR-MCNC: 70 MG/DL
ALP SERPL-CCNC: 57 U/L (ref 40–150)
ALT SERPL W P-5'-P-CCNC: 14 U/L (ref 0–70)
ANION GAP SERPL CALCULATED.3IONS-SCNC: 10 MMOL/L (ref 7–15)
APPEARANCE UR: CLEAR
AST SERPL W P-5'-P-CCNC: 25 U/L (ref 0–45)
BASOPHILS # BLD AUTO: 0 10E3/UL (ref 0–0.2)
BASOPHILS NFR BLD AUTO: 1 %
BILIRUB DIRECT SERPL-MCNC: <0.2 MG/DL (ref 0–0.3)
BILIRUB SERPL-MCNC: 1.1 MG/DL
BILIRUB UR QL STRIP: NEGATIVE
BUN SERPL-MCNC: 9.9 MG/DL (ref 6–20)
CA-I BLD-MCNC: 4.6 MG/DL (ref 4.4–5.2)
CALCIUM SERPL-MCNC: 6 MG/DL (ref 8.6–10)
CHLORIDE SERPL-SCNC: 108 MMOL/L (ref 98–107)
CK SERPL-CCNC: 86 U/L (ref 39–308)
COLOR UR AUTO: YELLOW
CREAT SERPL-MCNC: 0.74 MG/DL (ref 0.67–1.17)
DEPRECATED HCO3 PLAS-SCNC: 22 MMOL/L (ref 22–29)
EGFRCR SERPLBLD CKD-EPI 2021: >90 ML/MIN/1.73M2
EOSINOPHIL # BLD AUTO: 0 10E3/UL (ref 0–0.7)
EOSINOPHIL NFR BLD AUTO: 0 %
ERYTHROCYTE [DISTWIDTH] IN BLOOD BY AUTOMATED COUNT: 12.2 % (ref 10–15)
GLUCOSE BLDC GLUCOMTR-MCNC: 163 MG/DL (ref 70–99)
GLUCOSE BLDC GLUCOMTR-MCNC: 166 MG/DL (ref 70–99)
GLUCOSE SERPL-MCNC: 142 MG/DL (ref 70–99)
GLUCOSE UR STRIP-MCNC: NEGATIVE MG/DL
HCT VFR BLD AUTO: 53.2 % (ref 40–53)
HGB BLD-MCNC: 17.7 G/DL (ref 13.3–17.7)
HGB UR QL STRIP: ABNORMAL
IMM GRANULOCYTES # BLD: 0 10E3/UL
IMM GRANULOCYTES NFR BLD: 0 %
KETONES UR STRIP-MCNC: 20 MG/DL
LEUKOCYTE ESTERASE UR QL STRIP: NEGATIVE
LYMPHOCYTES # BLD AUTO: 1.6 10E3/UL (ref 0.8–5.3)
LYMPHOCYTES NFR BLD AUTO: 31 %
MAGNESIUM SERPL-MCNC: 2.3 MG/DL (ref 1.7–2.3)
MCH RBC QN AUTO: 27.6 PG (ref 26.5–33)
MCHC RBC AUTO-ENTMCNC: 33.3 G/DL (ref 31.5–36.5)
MCV RBC AUTO: 83 FL (ref 78–100)
MONOCYTES # BLD AUTO: 0.5 10E3/UL (ref 0–1.3)
MONOCYTES NFR BLD AUTO: 10 %
MUCOUS THREADS #/AREA URNS LPF: PRESENT /LPF
NEUTROPHILS # BLD AUTO: 2.9 10E3/UL (ref 1.6–8.3)
NEUTROPHILS NFR BLD AUTO: 58 %
NITRATE UR QL: NEGATIVE
NRBC # BLD AUTO: 0 10E3/UL
NRBC BLD AUTO-RTO: 0 /100
PH UR STRIP: 6 [PH] (ref 5–7)
PHOSPHATE SERPL-MCNC: 2.5 MG/DL (ref 2.5–4.5)
PLATELET # BLD AUTO: 258 10E3/UL (ref 150–450)
POTASSIUM SERPL-SCNC: 3.3 MMOL/L (ref 3.4–5.3)
PROT SERPL-MCNC: 4.9 G/DL (ref 6.4–8.3)
PTH-INTACT SERPL-MCNC: 51 PG/ML (ref 15–65)
RBC # BLD AUTO: 6.42 10E6/UL (ref 4.4–5.9)
RBC URINE: 2 /HPF
SODIUM SERPL-SCNC: 140 MMOL/L (ref 135–145)
SP GR UR STRIP: 1.03 (ref 1–1.03)
SQUAMOUS EPITHELIAL: <1 /HPF
TROPONIN T SERPL HS-MCNC: <6 NG/L
TSH SERPL DL<=0.005 MIU/L-ACNC: 1.47 UIU/ML (ref 0.3–4.2)
UROBILINOGEN UR STRIP-MCNC: <2 MG/DL
WBC # BLD AUTO: 5 10E3/UL (ref 4–11)
WBC URINE: 3 /HPF

## 2024-06-24 PROCEDURE — 85025 COMPLETE CBC W/AUTO DIFF WBC: CPT | Performed by: EMERGENCY MEDICINE

## 2024-06-24 PROCEDURE — 99285 EMERGENCY DEPT VISIT HI MDM: CPT | Mod: 25

## 2024-06-24 PROCEDURE — 93005 ELECTROCARDIOGRAM TRACING: CPT | Performed by: EMERGENCY MEDICINE

## 2024-06-24 PROCEDURE — 250N000013 HC RX MED GY IP 250 OP 250 PS 637: Performed by: HOSPITALIST

## 2024-06-24 PROCEDURE — 96374 THER/PROPH/DIAG INJ IV PUSH: CPT

## 2024-06-24 PROCEDURE — 96375 TX/PRO/DX INJ NEW DRUG ADDON: CPT

## 2024-06-24 PROCEDURE — 250N000012 HC RX MED GY IP 250 OP 636 PS 637: Performed by: HOSPITALIST

## 2024-06-24 PROCEDURE — 84484 ASSAY OF TROPONIN QUANT: CPT | Performed by: EMERGENCY MEDICINE

## 2024-06-24 PROCEDURE — 250N000011 HC RX IP 250 OP 636: Mod: JZ | Performed by: EMERGENCY MEDICINE

## 2024-06-24 PROCEDURE — G0378 HOSPITAL OBSERVATION PER HR: HCPCS

## 2024-06-24 PROCEDURE — 36415 COLL VENOUS BLD VENIPUNCTURE: CPT | Performed by: HOSPITALIST

## 2024-06-24 PROCEDURE — 82550 ASSAY OF CK (CPK): CPT | Performed by: EMERGENCY MEDICINE

## 2024-06-24 PROCEDURE — 73562 X-RAY EXAM OF KNEE 3: CPT | Mod: RT

## 2024-06-24 PROCEDURE — 81001 URINALYSIS AUTO W/SCOPE: CPT | Performed by: EMERGENCY MEDICINE

## 2024-06-24 PROCEDURE — 93005 ELECTROCARDIOGRAM TRACING: CPT | Performed by: STUDENT IN AN ORGANIZED HEALTH CARE EDUCATION/TRAINING PROGRAM

## 2024-06-24 PROCEDURE — 84132 ASSAY OF SERUM POTASSIUM: CPT | Performed by: HOSPITALIST

## 2024-06-24 PROCEDURE — 82330 ASSAY OF CALCIUM: CPT | Performed by: EMERGENCY MEDICINE

## 2024-06-24 PROCEDURE — 250N000011 HC RX IP 250 OP 636: Performed by: HOSPITALIST

## 2024-06-24 PROCEDURE — 83970 ASSAY OF PARATHORMONE: CPT | Performed by: EMERGENCY MEDICINE

## 2024-06-24 PROCEDURE — 84443 ASSAY THYROID STIM HORMONE: CPT | Performed by: EMERGENCY MEDICINE

## 2024-06-24 PROCEDURE — 80048 BASIC METABOLIC PNL TOTAL CA: CPT | Performed by: EMERGENCY MEDICINE

## 2024-06-24 PROCEDURE — 36415 COLL VENOUS BLD VENIPUNCTURE: CPT | Performed by: EMERGENCY MEDICINE

## 2024-06-24 PROCEDURE — 82248 BILIRUBIN DIRECT: CPT | Performed by: EMERGENCY MEDICINE

## 2024-06-24 PROCEDURE — 83735 ASSAY OF MAGNESIUM: CPT | Performed by: EMERGENCY MEDICINE

## 2024-06-24 PROCEDURE — 82962 GLUCOSE BLOOD TEST: CPT

## 2024-06-24 PROCEDURE — 99223 1ST HOSP IP/OBS HIGH 75: CPT | Performed by: HOSPITALIST

## 2024-06-24 PROCEDURE — 84100 ASSAY OF PHOSPHORUS: CPT | Performed by: EMERGENCY MEDICINE

## 2024-06-24 RX ORDER — SILDENAFIL 100 MG/1
50-100 TABLET, FILM COATED ORAL DAILY PRN
COMMUNITY
Start: 2024-06-06

## 2024-06-24 RX ORDER — FLUTICASONE PROPIONATE 50 MCG
1 SPRAY, SUSPENSION (ML) NASAL 2 TIMES DAILY PRN
Status: DISCONTINUED | OUTPATIENT
Start: 2024-06-24 | End: 2024-06-25 | Stop reason: HOSPADM

## 2024-06-24 RX ORDER — CYCLOBENZAPRINE HCL 10 MG
10 TABLET ORAL 3 TIMES DAILY PRN
Status: DISCONTINUED | OUTPATIENT
Start: 2024-06-24 | End: 2024-06-25 | Stop reason: HOSPADM

## 2024-06-24 RX ORDER — PSEUDOEPHED/ACETAMINOPH/DIPHEN 30MG-500MG
500-1000 TABLET ORAL EVERY 6 HOURS PRN
COMMUNITY
Start: 2024-03-18

## 2024-06-24 RX ORDER — ATORVASTATIN CALCIUM 10 MG/1
20 TABLET, FILM COATED ORAL AT BEDTIME
Status: DISCONTINUED | OUTPATIENT
Start: 2024-06-24 | End: 2024-06-25 | Stop reason: HOSPADM

## 2024-06-24 RX ORDER — DEXTROSE MONOHYDRATE 25 G/50ML
25-50 INJECTION, SOLUTION INTRAVENOUS
Status: DISCONTINUED | OUTPATIENT
Start: 2024-06-24 | End: 2024-06-25 | Stop reason: HOSPADM

## 2024-06-24 RX ORDER — METOPROLOL TARTRATE 25 MG/1
100 TABLET, FILM COATED ORAL 2 TIMES DAILY
Status: DISCONTINUED | OUTPATIENT
Start: 2024-06-24 | End: 2024-06-25 | Stop reason: HOSPADM

## 2024-06-24 RX ORDER — DILTIAZEM HYDROCHLORIDE 5 MG/ML
20 INJECTION INTRAVENOUS ONCE
Status: COMPLETED | OUTPATIENT
Start: 2024-06-24 | End: 2024-06-24

## 2024-06-24 RX ORDER — AMOXICILLIN 250 MG
2 CAPSULE ORAL 2 TIMES DAILY PRN
Status: DISCONTINUED | OUTPATIENT
Start: 2024-06-24 | End: 2024-06-25 | Stop reason: HOSPADM

## 2024-06-24 RX ORDER — ONDANSETRON 4 MG/1
4 TABLET, ORALLY DISINTEGRATING ORAL EVERY 6 HOURS PRN
Status: DISCONTINUED | OUTPATIENT
Start: 2024-06-24 | End: 2024-06-25 | Stop reason: HOSPADM

## 2024-06-24 RX ORDER — NICOTINE POLACRILEX 4 MG
15-30 LOZENGE BUCCAL
Status: DISCONTINUED | OUTPATIENT
Start: 2024-06-24 | End: 2024-06-25 | Stop reason: HOSPADM

## 2024-06-24 RX ORDER — ONDANSETRON 2 MG/ML
4 INJECTION INTRAMUSCULAR; INTRAVENOUS EVERY 6 HOURS PRN
Status: DISCONTINUED | OUTPATIENT
Start: 2024-06-24 | End: 2024-06-25 | Stop reason: HOSPADM

## 2024-06-24 RX ORDER — METFORMIN HCL 500 MG
500 TABLET, EXTENDED RELEASE 24 HR ORAL 2 TIMES DAILY WITH MEALS
Status: DISCONTINUED | OUTPATIENT
Start: 2024-06-24 | End: 2024-06-25 | Stop reason: HOSPADM

## 2024-06-24 RX ORDER — AMOXICILLIN 250 MG
1 CAPSULE ORAL 2 TIMES DAILY PRN
Status: DISCONTINUED | OUTPATIENT
Start: 2024-06-24 | End: 2024-06-25 | Stop reason: HOSPADM

## 2024-06-24 RX ORDER — BISACODYL 10 MG
10 SUPPOSITORY, RECTAL RECTAL DAILY PRN
Status: DISCONTINUED | OUTPATIENT
Start: 2024-06-24 | End: 2024-06-25 | Stop reason: HOSPADM

## 2024-06-24 RX ORDER — CALCIUM GLUCONATE 20 MG/ML
1 INJECTION, SOLUTION INTRAVENOUS ONCE
Status: COMPLETED | OUTPATIENT
Start: 2024-06-24 | End: 2024-06-24

## 2024-06-24 RX ORDER — DILTIAZEM HYDROCHLORIDE 30 MG/1
30 TABLET, FILM COATED ORAL EVERY 6 HOURS SCHEDULED
Status: DISCONTINUED | OUTPATIENT
Start: 2024-06-24 | End: 2024-06-25 | Stop reason: HOSPADM

## 2024-06-24 RX ORDER — CYCLOBENZAPRINE HCL 10 MG
10 TABLET ORAL 3 TIMES DAILY PRN
COMMUNITY
Start: 2024-04-01

## 2024-06-24 RX ORDER — POTASSIUM CHLORIDE 1500 MG/1
40 TABLET, EXTENDED RELEASE ORAL ONCE
Status: COMPLETED | OUTPATIENT
Start: 2024-06-24 | End: 2024-06-24

## 2024-06-24 RX ORDER — POLYETHYLENE GLYCOL 3350 17 G/17G
17 POWDER, FOR SOLUTION ORAL DAILY
Status: DISCONTINUED | OUTPATIENT
Start: 2024-06-24 | End: 2024-06-25 | Stop reason: HOSPADM

## 2024-06-24 RX ORDER — FLUTICASONE PROPIONATE 50 MCG
1 SPRAY, SUSPENSION (ML) NASAL 2 TIMES DAILY PRN
COMMUNITY

## 2024-06-24 RX ADMIN — ATORVASTATIN CALCIUM 20 MG: 10 TABLET, FILM COATED ORAL at 22:11

## 2024-06-24 RX ADMIN — CYCLOBENZAPRINE HYDROCHLORIDE 10 MG: 10 TABLET, FILM COATED ORAL at 22:24

## 2024-06-24 RX ADMIN — DILTIAZEM HYDROCHLORIDE 20 MG: 5 INJECTION INTRAVENOUS at 12:40

## 2024-06-24 RX ADMIN — METOPROLOL TARTRATE 100 MG: 25 TABLET, FILM COATED ORAL at 19:26

## 2024-06-24 RX ADMIN — POTASSIUM CHLORIDE 40 MEQ: 1500 TABLET, EXTENDED RELEASE ORAL at 19:04

## 2024-06-24 RX ADMIN — DILTIAZEM HYDROCHLORIDE 30 MG: 30 TABLET, FILM COATED ORAL at 23:18

## 2024-06-24 RX ADMIN — Medication 5 MG: at 22:23

## 2024-06-24 RX ADMIN — CALCIUM GLUCONATE 1 G: 20 INJECTION, SOLUTION INTRAVENOUS at 14:37

## 2024-06-24 RX ADMIN — APIXABAN 5 MG: 5 TABLET, FILM COATED ORAL at 19:27

## 2024-06-24 RX ADMIN — DILTIAZEM HYDROCHLORIDE 30 MG: 30 TABLET, FILM COATED ORAL at 19:05

## 2024-06-24 RX ADMIN — ONDANSETRON 4 MG: 4 TABLET, ORALLY DISINTEGRATING ORAL at 18:56

## 2024-06-24 RX ADMIN — DICLOFENAC 4 G: 10 GEL TOPICAL at 19:00

## 2024-06-24 RX ADMIN — CYCLOBENZAPRINE HYDROCHLORIDE 10 MG: 10 TABLET, FILM COATED ORAL at 18:58

## 2024-06-24 RX ADMIN — METFORMIN ER 500 MG 500 MG: 500 TABLET ORAL at 19:27

## 2024-06-24 RX ADMIN — INSULIN ASPART 1 UNITS: 100 INJECTION, SOLUTION INTRAVENOUS; SUBCUTANEOUS at 19:27

## 2024-06-24 RX ADMIN — POLYETHYLENE GLYCOL 3350 17 G: 17 POWDER, FOR SOLUTION ORAL at 19:02

## 2024-06-24 ASSESSMENT — ENCOUNTER SYMPTOMS
NAUSEA: 0
NUMBNESS: 0
CHILLS: 0
ABDOMINAL PAIN: 0
FEVER: 0
SHORTNESS OF BREATH: 0
WEAKNESS: 0
HEMATURIA: 0
VOMITING: 0
BACK PAIN: 1
DYSURIA: 0
LIGHT-HEADEDNESS: 1
DIZZINESS: 0

## 2024-06-24 ASSESSMENT — ACTIVITIES OF DAILY LIVING (ADL)
ADLS_ACUITY_SCORE: 35
ADLS_ACUITY_SCORE: 32
ADLS_ACUITY_SCORE: 35
ADLS_ACUITY_SCORE: 31
ADLS_ACUITY_SCORE: 35
ADLS_ACUITY_SCORE: 32
ADLS_ACUITY_SCORE: 31
ADLS_ACUITY_SCORE: 35
ADLS_ACUITY_SCORE: 32
ADLS_ACUITY_SCORE: 35

## 2024-06-24 NOTE — LETTER
[facility] EXTENDED RECOVERY AND SHORT STAY  [facility] [facility] [address]  Phone: [phone]  Fax: [phone]    June 25, 2024        Alfredo Du  [address] [place] [address]          To whom it may concern:    RE: Alfredo GOYAL Florian    Mr. Du was seen was admitted to the hospital 6/24/24 - 6/25/25 and missed work. He was also seen in the Emergency Department 6/22/24.  He should remain off work through Sunday 6/30/24.  He may return to work 7/1/24 with No restrictions    Please contact me for questions or concerns.      Sincerely,      Torrey Ann MD

## 2024-06-24 NOTE — MEDICATION SCRIBE - ADMISSION MEDICATION HISTORY
Medication Scribe Admission Medication History    Admission medication history is complete. The information provided in this note is only as accurate as the sources available at the time of the update.    Information Source(s): Patient and CareEverywhere/SureScripts via in-person    Pertinent Information: pt is a poor historian  during interview wasn't able to confirm most med's. Pt is unsure if he is still taking minocycline marked unknown for now     Changes made to PTA medication list:  Added: None  Deleted: None  Changed: None    Allergies reviewed with patient and updates made in EHR: yes    Medication History Completed By: KAMARI ALSTON 6/24/2024 3:45 PM    PTA Med List   Medication Sig Last Dose    acetaminophen (TYLENOL) 500 MG tablet Take 500-1,000 mg by mouth every 6 hours as needed 6/24/2024 at am    apixaban ANTICOAGULANT (ELIQUIS ANTICOAGULANT) 5 MG tablet Take 1 tablet (5 mg) by mouth 2 times daily for 30 days 6/24/2024 at am    atorvastatin (LIPITOR) 20 MG tablet [ATORVASTATIN (LIPITOR) 20 MG TABLET] Take 20 mg by mouth at bedtime. 6/23/2024 at pm    cyclobenzaprine (FLEXERIL) 10 MG tablet Take 10 mg by mouth 3 times daily as needed 6/24/2024 at am    DAILY-MARCIA tablet [DAILY-MARCIA TABLET] Take 1 tablet by mouth daily. 6/23/2024 at am    diclofenac (VOLTAREN) 1 % topical gel Apply 4 g topically 4 times daily 6/24/2024 at am    fluticasone (FLONASE) 50 MCG/ACT nasal spray Spray 1 spray into both nostrils 2 times daily as needed for rhinitis or allergies Past Week at per pt prn    hydrochlorothiazide (HYDRODIURIL) 25 MG tablet [HYDROCHLOROTHIAZIDE (HYDRODIURIL) 25 MG TABLET] Take 25 mg by mouth daily.  6/23/2024 at pm    ibuprofen (ADVIL,MOTRIN) 600 MG tablet [IBUPROFEN (ADVIL,MOTRIN) 600 MG TABLET] as needed.  Unknown at prn    metFORMIN (GLUCOPHAGE XR) 500 MG 24 hr tablet Take 500 mg by mouth 2 times daily (with meals) 6/23/2024 at pm-no dose tdy    metoprolol tartrate (LOPRESSOR) 100 MG tablet  Take 1 tablet (100 mg) by mouth 2 times daily for 30 days 6/24/2024 at am    minocycline (MINOCIN,DYNACIN) 100 MG capsule [MINOCYCLINE (MINOCIN,DYNACIN) 100 MG CAPSULE] Take 100 mg by mouth 2 (two) times a day. Unknown at per pt not sure    sildenafil (VIAGRA) 100 MG tablet Take  mg by mouth daily as needed Unknown at prn

## 2024-06-24 NOTE — H&P
[facility]    History and Physical - Hospitalist Service       Date of Admission:  2024  Alfredo Du,  1970, MRN [id number]  PCP: Shyla Milian    Assessment & Plan      Alfredo Du is a 54 year old male admitted on 2024. He has history of HTN, prediabetes, paroxysmal A-fib, cocaine abuse here for evaluation of left flank pain and lightheadedness found to be in A-fib with RVR    # A-fib RVR  -He tells me A-fib is a known diagnosis for him, not previously requiring medical intervention or anticoagulation.  He was seen in the ED 2 days ago for flank pain was in A-fib with RVR.  His metoprolol which he takes for blood pressure was increased.  Today he comes in with lightheadedness found to be once again in A-fib with RVR.  -Continue maximum dose metoprolol  -Responded well to IV diltiazem, continue diltiazem by mouth for now  -Echocardiogram  -Cardiology consultation, sounds like planning RADHA tomorrow.  N.p.o. after midnight  -Monitor on telemetry  -Continue home Eliquis which was just started 2 days ago  -Recommended cocaine cessation, patient in agreement.      # Left flank pain  -Had CT scan on  did not reveal a cause for his pain.  Has been felt to be musculoskeletal  -Pain has been improving slowly over the last 2 days  -no kidney stone seen on CT, but UA did have some microscopic hematuria  -Okay to use home Flexeril, topical Voltaren  -Tylenol as needed  -monitor and if does not continue to improve spontaneously may need further workup    # Cocaine abuse   -social work consult for resources    # Microscopic hematuria  -Follow-up as outpatient    # Hypokalemia  -Protocol    # Hypertension  -Continue home metoprolol that was recently increased  -Hold home hydrochlorothiazide  -Starting on diltiazem as above    # Hyperlipidemia, home atorvastatin  # Prediabetes, home metformin         DVT Prophylaxis: Moderate risk. Eliquis  Diet: Regular Diet  "Adult  NPO per Anesthesia Guidelines for Procedure/Surgery Except for: Meds  Razo Catheter: Not present  Lines: None     Cardiac Monitoring: ACTIVE order. Indication: Tachyarrhythmias, acute (48 hours)  Code Status: Full Code    Clinically Significant Risk Factors Present on Admission        # Hypokalemia: Lowest K = 3.3 mmol/L in last 2 days, will replace as needed       # Hypoalbuminemia: Lowest albumin = 3.1 g/dL at 6/24/2024  1:06 PM, will monitor as appropriate    # Drug Induced Coagulation Defect: home medication list includes an anticoagulant medication                # Obesity: Estimated body mass index is 38.18 kg/m  as calculated from the following:    Height as of this encounter: 1.702 m (5' 7\").    Weight as of this encounter: 110.6 kg (243 lb 12.8 oz).              Disposition Plan      Expected Discharge Date: 06/25/2024                The patient's care was discussed with the Patient.    Susan Aden MD  Hospitalist Service  [facility]  Securely message with [employer] (more info)  Text page via [facility] Paging/Directory     ______________________________________________________________________    Chief Complaint   Lightheaded, flank pain    History is obtained from the patient    History of Present Illness   Alfredo Du is a 54 year old male who is here for aforementioned symptoms.   2 days ago had L flank pain new sharp, along with lightheadedness and nausea, came to ED.   Discharged on higher dose metoprolol, and Eliquis.  Today had pain in back still, but a bit better, but also was very lightheaded, nauseated, and just felt exhausted, so came back.   Has not been eating much, mostly just fruit  Occasionally wakes from sleep feeling chilled or flushed.  Occasional watery stool.  Denies fever, cough, dyspnea, chest pain.  Reports he used cocaine 3 days ago, none since.      Past Medical History    No past medical history on file.    Past Surgical History   No past surgical " history on file.    Prior to Admission Medications   Prior to Admission Medications   Prescriptions Last Dose Informant Patient Reported? Taking?   DAILY-MARCIA tablet 6/23/2024 at am  Yes Yes   Sig: [DAILY-MARCIA TABLET] Take 1 tablet by mouth daily.   acetaminophen (TYLENOL) 500 MG tablet 6/24/2024 at am  Yes Yes   Sig: Take 500-1,000 mg by mouth every 6 hours as needed   apixaban ANTICOAGULANT (ELIQUIS ANTICOAGULANT) 5 MG tablet 6/24/2024 at am  No Yes   Sig: Take 1 tablet (5 mg) by mouth 2 times daily for 30 days   atorvastatin (LIPITOR) 20 MG tablet 6/23/2024 at pm  Yes Yes   Sig: [ATORVASTATIN (LIPITOR) 20 MG TABLET] Take 20 mg by mouth at bedtime.   cyclobenzaprine (FLEXERIL) 10 MG tablet 6/24/2024 at am  Yes Yes   Sig: Take 10 mg by mouth 3 times daily as needed   diclofenac (VOLTAREN) 1 % topical gel 6/24/2024 at am  No Yes   Sig: Apply 4 g topically 4 times daily   fluticasone (FLONASE) 50 MCG/ACT nasal spray Past Week at per pt prn  Yes Yes   Sig: Spray 1 spray into both nostrils 2 times daily as needed for rhinitis or allergies   hydrochlorothiazide (HYDRODIURIL) 25 MG tablet 6/23/2024 at pm  Yes Yes   Sig: [HYDROCHLOROTHIAZIDE (HYDRODIURIL) 25 MG TABLET] Take 25 mg by mouth daily.    ibuprofen (ADVIL,MOTRIN) 600 MG tablet Unknown at prn  Yes Yes   Sig: [IBUPROFEN (ADVIL,MOTRIN) 600 MG TABLET] as needed.    metFORMIN (GLUCOPHAGE XR) 500 MG 24 hr tablet 6/23/2024 at pm-no dose tdy  Yes Yes   Sig: Take 500 mg by mouth 2 times daily (with meals)   metoprolol tartrate (LOPRESSOR) 100 MG tablet 6/24/2024 at am  No Yes   Sig: Take 1 tablet (100 mg) by mouth 2 times daily for 30 days   minocycline (MINOCIN,DYNACIN) 100 MG capsule Unknown at per pt not sure  Yes Yes   Sig: [MINOCYCLINE (MINOCIN,DYNACIN) 100 MG CAPSULE] Take 100 mg by mouth 2 (two) times a day.   sildenafil (VIAGRA) 100 MG tablet Unknown at prn  Yes Yes   Sig: Take  mg by mouth daily as needed      Facility-Administered Medications: None         Social History   I have reviewed this patient's social history and updated it with pertinent information if needed.  Social History     Tobacco Use    Smoking status: Former    Smokeless tobacco: Never   Substance Use Topics    Alcohol use: No    Drug use: Yes     Types: Cocaine        Physical Exam   Vital Signs: Temp: 97.9  F (36.6  C) Temp src: Oral BP: 139/87 Pulse: 88   Resp: 15 SpO2: 100 % O2 Device: None (Room air)    Weight: 243 lbs 12.8 oz  General: in no apparent distress, non-toxic, and alert male lying in hospital bed oriented x3  HEENT: Head normocephalic atraumatic, oral mucosa moist. Sclerae anicteric  CV: Irregularly irregular rhythm, normal rate, no murmurs  Resp: No wheezes, no rales or rhonchi, no focal consolidations  GI: Belly soft, nondistended, nontender, bowel sounds present  Skin: No rashes or lesions  Extremities: No peripheral edema  Psych: Normal affect, mood euthymic  Neuro: Grossly normal    Medical Decision Making                 Data   Imaging results reviewed over the past 24 hrs:   Recent Results (from the past 24 hour(s))   XR Knee Right 3 Views    Narrative    EXAM: XR KNEE RIGHT 3 VIEWS  LOCATION: [facility]  DATE: 6/24/2024    INDICATION: R knee injury and medial knee pain  COMPARISON: None.      Impression    IMPRESSION: Mild degenerative changes. No acute fracture or joint effusion.     Recent Results (from the past 12 hour(s))   Magnesium    Collection Time: 06/24/24 12:36 PM   Result Value Ref Range    Magnesium 2.3 1.7 - 2.3 mg/dL   TSH with free T4 reflex    Collection Time: 06/24/24 12:36 PM   Result Value Ref Range    TSH 1.47 0.30 - 4.20 uIU/mL   CBC with platelets and differential    Collection Time: 06/24/24 12:36 PM   Result Value Ref Range    WBC Count 5.0 4.0 - 11.0 10e3/uL    RBC Count 6.42 (H) 4.40 - 5.90 10e6/uL    Hemoglobin 17.7 13.3 - 17.7 g/dL    Hematocrit 53.2 (H) 40.0 - 53.0 %    MCV 83 78 - 100 fL    MCH 27.6 26.5 - 33.0 pg     MCHC 33.3 31.5 - 36.5 g/dL    RDW 12.2 10.0 - 15.0 %    Platelet Count 258 150 - 450 10e3/uL    % Neutrophils 58 %    % Lymphocytes 31 %    % Monocytes 10 %    % Eosinophils 0 %    % Basophils 1 %    % Immature Granulocytes 0 %    NRBCs per 100 WBC 0 <1 /100    Absolute Neutrophils 2.9 1.6 - 8.3 10e3/uL    Absolute Lymphocytes 1.6 0.8 - 5.3 10e3/uL    Absolute Monocytes 0.5 0.0 - 1.3 10e3/uL    Absolute Eosinophils 0.0 0.0 - 0.7 10e3/uL    Absolute Basophils 0.0 0.0 - 0.2 10e3/uL    Absolute Immature Granulocytes 0.0 <=0.4 10e3/uL    Absolute NRBCs 0.0 10e3/uL   Basic metabolic panel    Collection Time: 06/24/24  1:06 PM   Result Value Ref Range    Sodium 140 135 - 145 mmol/L    Potassium 3.3 (L) 3.4 - 5.3 mmol/L    Chloride 108 (H) 98 - 107 mmol/L    Carbon Dioxide (CO2) 22 22 - 29 mmol/L    Anion Gap 10 7 - 15 mmol/L    Urea Nitrogen 9.9 6.0 - 20.0 mg/dL    Creatinine 0.74 0.67 - 1.17 mg/dL    GFR Estimate >90 >60 mL/min/1.73m2    Calcium 6.0 (L) 8.6 - 10.0 mg/dL    Glucose 142 (H) 70 - 99 mg/dL   Troponin T, High Sensitivity    Collection Time: 06/24/24  1:06 PM   Result Value Ref Range    Troponin T, High Sensitivity <6 <=22 ng/L   Phosphorus    Collection Time: 06/24/24  1:06 PM   Result Value Ref Range    Phosphorus 2.5 2.5 - 4.5 mg/dL   CK total    Collection Time: 06/24/24  1:06 PM   Result Value Ref Range    CK 86 39 - 308 U/L   Hepatic panel    Collection Time: 06/24/24  1:06 PM   Result Value Ref Range    Protein Total 4.9 (L) 6.4 - 8.3 g/dL    Albumin 3.1 (L) 3.5 - 5.2 g/dL    Bilirubin Total 1.1 <=1.2 mg/dL    Alkaline Phosphatase 57 40 - 150 U/L    AST 25 0 - 45 U/L    ALT 14 0 - 70 U/L    Bilirubin Direct <0.20 0.00 - 0.30 mg/dL   UA with Microscopic reflex to Culture    Collection Time: 06/24/24  1:13 PM    Specimen: Urine, Midstream   Result Value Ref Range    Color Urine Yellow Colorless, Straw, Light Yellow, Yellow    Appearance Urine Clear Clear    Glucose Urine Negative Negative mg/dL     Bilirubin Urine Negative Negative    Ketones Urine 20 (A) Negative mg/dL    Specific Gravity Urine 1.035 (H) 1.001 - 1.030    Blood Urine 0.03 mg/dL (A) Negative    pH Urine 6.0 5.0 - 7.0    Protein Albumin Urine 70 (A) Negative mg/dL    Urobilinogen Urine <2.0 <2.0 mg/dL    Nitrite Urine Negative Negative    Leukocyte Esterase Urine Negative Negative    Mucus Urine Present (A) None Seen /LPF    RBC Urine 2 <=2 /HPF    WBC Urine 3 <=5 /HPF    Squamous Epithelials Urine <1 <=1 /HPF   Ionized Calcium    Collection Time: 06/24/24  2:34 PM   Result Value Ref Range    Calcium Ionized Whole Blood 4.6 4.4 - 5.2 mg/dL

## 2024-06-24 NOTE — ED PROVIDER NOTES
EMERGENCY DEPARTMENT ENCOUNTER      NAME: Alfredo Du  AGE: 54 year old male  YOB: 1970  MRN: [id number]  EVALUATION DATE & TIME: 6/24/2024 11:59 AM    PCP: Shyla Milian    ED PROVIDER: Ingrid Glover PA-C      Chief Complaint   Patient presents with    Generalized Weakness         FINAL IMPRESSION:  1. Atrial flutter with rapid ventricular response (H)          ED COURSE & MEDICAL DECISION MAKING:    Pertinent Labs & Imaging studies reviewed. (See chart for details)    54 year old male presents to the Emergency Department for evaluation of lightheadedness/generalized weakness.    Physical exam is remarkable for a generally well-appearing male who is in no acute distress.  Heart and lung sounds are clear diffusely throughout.  Abdomen is soft and nontender.  No CVA tenderness or midline spinal tenderness, patient moves all extremities without difficulty.  Cranial nerves III through XII appear grossly intact.  Vital signs are remarkable for tachycardia, otherwise stable and he is afebrile.      CBC is unremarkable with no leukocytosis or anemia.  BMP is unremarkable with no significant electrolyte derangements, normal kidney function.  Hepatic panel remarkable for low albumin at 3.1 but normal LFTs.  Interestingly, the patient's calcium was low today at 6.0, previously normal at 9.1 two days ago on two runs by lab.  Ionized calcium was normal.  Phosphorus was normal.  Total CK was normal.  Magnesium within normal limits.  TSH within normal limits.  Troponin is negative, EKG shows a flutter without acute ischemic changes. Urinalysis without evidence of blood or infection. XR of the right knee obtained as patient complained of some pain there, this was negative without evidence of fracture, dislocation, or significant effusion.    The patient was given IV diltiazem with successful rate control of his aflutter, Hrs improved from the 130s-140s down to the 70s-80s. I discussed his  presentation with cardiology who recommend RADHA and cardioversion given persistent a flutter and the amount of symptoms he has associated with it.  Regarding his calcium, it is not entirely clear whether he truly is hypocalcemic however the lab did run 2 different BMP specimens.  He did get 1 g of calcium gluconate IV here.  Patient will be admitted to the hospital for further management, he is agreeable with this treatment plan and verbalized understanding.  Care discussed with staff physician Dr. Juan Manuel Farley who is in agreement with the treatment plan.    Medical Decision Making    History:  Supplemental history from: Documented in chart  External Record(s) reviewed: Inpatient Record: visit two days ago    Work Up:  Chart documentation includes differential considered and any EKGs or imaging independently interpreted by provider, where specified.  In additional to work up documented, I considered the following work up: Documented in chart, if applicable.    External consultation:  Discussion of management with another provider: Cardiology and Hospitalist    Complicating factors:  Care impacted by chronic illness: Anticoagulated State and Hypertension  Care affected by social determinants of health: Access to Affordable Health Care    Disposition considerations: Admit.    ED Course   12:01 PM Performed my initial history and physical exam. Discussed workup in the emergency department, management of symptoms, and likely disposition.   12:13 PM Discussed with staff physician Dr. Juan Manuel Farley.   2:29 PM Discussed with Dr. Roach with cardiology. Updated patient.   2:59 PM Discussed with Dr. Aden with admitting service who accepts the patient to cardiac tele observation.     At the conclusion of the encounter I discussed the results of all of the tests and the disposition. The questions were answered. The patient or family acknowledged understanding and was agreeable with the care plan.     Voice recognition software  was used in the creation of this note. Any grammatical or nonsensical errors are due to inherent errors with the software and are not the intention of the writer.     MEDICATIONS GIVEN IN THE EMERGENCY:  Medications   diltiazem (CARDIZEM) injection 20 mg (20 mg Intravenous $Given 6/24/24 1240)   calcium gluconate 1 g in 50 mL in sodium chloride intermittent infusion (1 g Intravenous $Given 6/24/24 1437)       NEW PRESCRIPTIONS STARTED AT TODAY'S ER VISIT  New Prescriptions    No medications on file            =================================================================    HPI    Patient information was obtained from: Patient    Use of : N/A       Alfredo Du is a 54 year old male with PMH of HTN, MIKE, Afib with RVR who presents to the ED via walk-in for evaluation of generalized weakness.     The patient reports that since he was seen here Saturday (2 days ago), he continues to feel generally weak and lightheaded.  He notes he feels lightheaded both at rest and with activities/movement.  He has been taking his increased metoprolol dose of 100 mg twice daily and Eliquis since his discharge, last doses were this morning.  He also continues to have left flank/back pain which he describes as aching.  He notes the pain is worse with certain movements or laying on the affected area.  He has been taking Tylenol without much improvement in his pain.    He denies any chest pain, difficulty breathing, syncope, abdominal pain, nausea, vomiting, dysuria, or hematuria.    REVIEW OF SYSTEMS   Review of Systems   Constitutional:  Negative for chills and fever.   Respiratory:  Negative for shortness of breath.    Cardiovascular:  Negative for chest pain.   Gastrointestinal:  Negative for abdominal pain, nausea and vomiting.   Genitourinary:  Negative for dysuria and hematuria.   Musculoskeletal:  Positive for back pain (Left).   Neurological:  Positive for light-headedness. Negative for dizziness, syncope,  "weakness and numbness.       All other systems reviewed and are negative unless noted in HPI.      PAST MEDICAL HISTORY:  No past medical history on file.    PAST SURGICAL HISTORY:  No past surgical history on file.    CURRENT MEDICATIONS:    acetaminophen (TYLENOL) 500 MG tablet  apixaban ANTICOAGULANT (ELIQUIS ANTICOAGULANT) 5 MG tablet  atorvastatin (LIPITOR) 20 MG tablet  cyclobenzaprine (FLEXERIL) 10 MG tablet  DAILY-MARCIA tablet  diclofenac (VOLTAREN) 1 % topical gel  fluticasone (FLONASE) 50 MCG/ACT nasal spray  hydrochlorothiazide (HYDRODIURIL) 25 MG tablet  ibuprofen (ADVIL,MOTRIN) 600 MG tablet  metFORMIN (GLUCOPHAGE XR) 500 MG 24 hr tablet  metoprolol tartrate (LOPRESSOR) 100 MG tablet  minocycline (MINOCIN,DYNACIN) 100 MG capsule  sildenafil (VIAGRA) 100 MG tablet        ALLERGIES:  Allergies   Allergen Reactions    Contrast Dye Shortness Of Breath    Iodinated Contrast Media Shortness Of Breath and Difficulty breathing       FAMILY HISTORY:  No family history on file.    SOCIAL HISTORY:   Social History     Socioeconomic History    Marital status:    Tobacco Use    Smoking status: Former    Smokeless tobacco: Never   Substance and Sexual Activity    Alcohol use: No    Drug use: No       VITALS:  Patient Vitals for the past 24 hrs:   BP Temp Temp src Pulse Resp SpO2 Height Weight   06/24/24 1431 138/87 -- -- 85 29 97 % -- --   06/24/24 1415 (!) 157/91 -- -- 77 14 100 % -- --   06/24/24 1400 (!) 140/86 -- -- 76 12 100 % -- --   06/24/24 1351 130/79 -- -- 77 -- 97 % -- --   06/24/24 1316 133/84 -- -- 76 13 95 % -- --   06/24/24 1300 (!) 156/93 -- -- 75 17 97 % -- --   06/24/24 1246 127/59 -- -- 76 11 93 % -- --   06/24/24 1233 (!) 163/74 -- -- (!) 138 22 96 % -- --   06/24/24 1213 (!) 152/99 -- -- (!) 133 21 96 % -- --   06/24/24 1208 (!) 138/90 -- -- (!) 141 28 94 % -- --   06/24/24 1151 127/89 97.7  F (36.5  C) Oral 90 20 98 % 1.702 m (5' 7\") 110.6 kg (243 lb 12.8 oz)       PHYSICAL EXAM    VITAL " "SIGNS: /87   Pulse 85   Temp 97.7  F (36.5  C) (Oral)   Resp 29   Ht 1.702 m (5' 7\")   Wt 110.6 kg (243 lb 12.8 oz)   SpO2 97%   BMI 38.18 kg/m    General Appearance: Alert, cooperative, normal speech and facial symmetry, appears stated age, the patient does not appear in distress  Head:  Normocephalic, without obvious abnormality, atraumatic  Eyes: Conjunctiva/corneas clear, EOM's intact, no nystagmus, PERRL  ENT:  Lips, mucosa, and tongue normal; teeth and gums normal, no pharyngeal inflammation, no dysphonia or difficulty swallowing, membranes are moist without pallor  Cardio: Tachycardic, irregular rhythm; S1 and S2 normal, no murmur, rub    or gallop, 2+ pulses symmetric in all extremities  Pulm:  Clear to auscultation bilaterally, respirations unlabored with no accessory muscle use  Abdomen:  Abdomen is soft, non-distended with no tenderness to palpation, rebound tenderness, or guarding.   Back: No midline tenderness or step-offs, no CVA tenderness  Extremities:  Extremities normal, there is no tenderness to palpation, atraumatic, no cyanosis or edema, full function and range of motion, pulses equal in all extremities, normal cap refill, no joint swelling  Skin: Skin is warm and dry, no rashes or wounds  Neuro: Patient is awake, alert, and responsive to voice. No gross motor weaknesses or sensory loss; moves all extremities. Cranial Nerves:  CN2: No funduscopic exam performed. CN3,4 & 6: Pupillary light response, lateral and vertical gaze normal.  No nystagmus.  CN7: No facial weakness, smile, facial symmetry intact. CN8: Intact to spoken voice. CN9&10: Gag reflex, uvula midline, palate rises with phonation. CN11: Shoulder shrug 5/5 intact bilaterally. CN12: Tongue midline and moves freely from side to side.      LAB:  All pertinent labs reviewed and interpreted.  Labs Ordered and Resulted from Time of ED Arrival to Time of ED Departure   BASIC METABOLIC PANEL - Abnormal       Result Value    " Sodium 140      Potassium 3.3 (*)     Chloride 108 (*)     Carbon Dioxide (CO2) 22      Anion Gap 10      Urea Nitrogen 9.9      Creatinine 0.74      GFR Estimate >90      Calcium 6.0 (*)     Glucose 142 (*)    ROUTINE UA WITH MICROSCOPIC REFLEX TO CULTURE - Abnormal    Color Urine Yellow      Appearance Urine Clear      Glucose Urine Negative      Bilirubin Urine Negative      Ketones Urine 20 (*)     Specific Gravity Urine 1.035 (*)     Blood Urine 0.03 mg/dL (*)     pH Urine 6.0      Protein Albumin Urine 70 (*)     Urobilinogen Urine <2.0      Nitrite Urine Negative      Leukocyte Esterase Urine Negative      Mucus Urine Present (*)     RBC Urine 2      WBC Urine 3      Squamous Epithelials Urine <1     CBC WITH PLATELETS AND DIFFERENTIAL - Abnormal    WBC Count 5.0      RBC Count 6.42 (*)     Hemoglobin 17.7      Hematocrit 53.2 (*)     MCV 83      MCH 27.6      MCHC 33.3      RDW 12.2      Platelet Count 258      % Neutrophils 58      % Lymphocytes 31      % Monocytes 10      % Eosinophils 0      % Basophils 1      % Immature Granulocytes 0      NRBCs per 100 WBC 0      Absolute Neutrophils 2.9      Absolute Lymphocytes 1.6      Absolute Monocytes 0.5      Absolute Eosinophils 0.0      Absolute Basophils 0.0      Absolute Immature Granulocytes 0.0      Absolute NRBCs 0.0     HEPATIC FUNCTION PANEL - Abnormal    Protein Total 4.9 (*)     Albumin 3.1 (*)     Bilirubin Total 1.1      Alkaline Phosphatase 57      AST 25      ALT 14      Bilirubin Direct <0.20     MAGNESIUM - Normal    Magnesium 2.3     TROPONIN T, HIGH SENSITIVITY - Normal    Troponin T, High Sensitivity <6     TSH WITH FREE T4 REFLEX - Normal    TSH 1.47     PHOSPHORUS - Normal    Phosphorus 2.5     IONIZED CALCIUM - Normal    Calcium Ionized Whole Blood 4.6     CK TOTAL - Normal    CK 86     PARATHYROID HORMONE INTACT       RADIOLOGY:  Reviewed all pertinent imaging. Please see official radiology report.  XR Knee Right 3 Views   Final Result    IMPRESSION: Mild degenerative changes. No acute fracture or joint effusion.      Echocardiogram Complete    (Results Pending)       EKG:    Performed at: 12:04    I have independently reviewed and interpreted the EKG, along with the final read. EKG also reviewed by Dr. Torrey Velez.    Ventricular rate 114 bpm  GA interval * ms  QRS duration 116 ms  QT/QTc 376/518 ms  P-R-T axes 264 -2 33     Impression: Aflutter        Ingrid Glover PA-C  Emergency Medicine  [facility] EMERGENCY DEPARTMENT  [address] [place] [id number]  [phone]  Dept: [phone]       Ingrid Glover PA-C  06/24/24 1598

## 2024-06-24 NOTE — ED NOTES
"[facility] ED Handoff Report    ED Chief Complaint:     ED Diagnosis:  (I48.92) Atrial flutter with rapid ventricular response (H)  Comment:   Plan:        PMH:  No past medical history on file.     Code Status:  No Order     Falls Risk: Yes Band: Applied    Current Living Situation/Residence: lives alone     Elimination Status: Continent: Yes     Activity Level: Independent, SBA due to aflutter    Patients Preferred Language:  English     Needed: No    Vital Signs:  /87   Pulse 85   Temp 97.7  F (36.5  C) (Oral)   Resp 29   Ht 1.702 m (5' 7\")   Wt 110.6 kg (243 lb 12.8 oz)   SpO2 97%   BMI 38.18 kg/m       Cardiac Rhythm: aflutter    Pain Score: 0/10    Is the Patient Confused:  No    Last Food or Drink: 06/24/24     Focused Assessment:  patient endorsed generalized weakness after starting new bp meds came to ED and was noted to be in aflutter rate up to 140 given dilt which dropped rate but patient remains in aflutter.     Tests Performed: Done: Labs and Imaging    Treatments Provided:  diltiazem    Family Dynamics/Concerns: No    Family Updated On Visitor Policy: No    Plan of Care Communicated to Family: No    Who Was Updated about Plan of Care: patient    Belongings Checklist Done and Signed by Patient: Yes    Belongings Sent with Patient: yes          Additional Information:     RN: Mirta Mora RN   6/24/2024 5:24 PM      "

## 2024-06-24 NOTE — PROGRESS NOTES
Patient admitted to room 7 at approximately 5:52 PM  via cart from emergency room.  Reason for Admission: Aflutter w/ RVR  Report received from: BURAK Kirby  Patient was accompanied by Self.  Discharge transportation provided by:  Patient ambulated/transferred:  with stand-by assist. self.  Patient is alert and orientated x 3.  Outpatient Observation education provided to: patient,   MDRO Education done if applicable (MRSA, VRE, etc)  Safety risks were identified during admission:  none.   Yellow risk/fall band applied:  No  Home meds sent home: No  Home meds sent to pharmacy:No IF YES add some kind of note to chart for discharging nurse (see 1/2 sheet of laminated paper in HUC drawer)  Detailed Belongings: Cell phone, shirt, pants, shoes, wallet

## 2024-06-24 NOTE — ED NOTES
Emergency Department Midlevel Supervisory Note      I had a face to face encounter with this patient seen by the Advanced Practice Provider (МАРИНА). I personally made/approved the management plan and take responsibility for the patient management. I personally saw patient and performed a substantive portion of the visit including all aspects of the medical decision making.     ED Course:  12:13 PM  Ingrid Orellanantosh staffed patient with me. I agree with their assessment and plan of management, and I will see the patient.  12:30 PM I met with the patient to introduce myself, gather additional history, perform my initial exam, and discuss the plan.     Brief HPI:     Alfredo Du is a 54 year old male who presents for evaluation of generalized weakness.     I, Amy Nichols, am serving as a scribe to document services personally performed by Jose Farley MD based on my observations and the provider's statements to me.   I, Jose Farley MD, attest that Amy Nichols was acting in a scribe capacity, has observed my performance of the services and has documented them in accordance with my direction.    Brief Physical Exam:  Constitutional:  Alert, in no acute distress  EYES: Conjunctivae clear  HENT:  Atraumatic, normocephalic  Respiratory:  Respirations even, unlabored, in no acute respiratory distress  Cardiovascular:  Regular rate and rhythm, good peripheral perfusion  GI: Soft, nondistended, nontender  Musculoskeletal:  No edema. No cyanosis. Range of motion major extremities intact.    Integument: Warm, Dry, No erythema, No rash.   Neurologic:  Alert & oriented, no focal deficits noted  Psych: Normal mood and affect     MDM:  Patient is a 54-year-old male who presents to the emergency department for complaints of intermittent lightheadedness and fatigue.  He was recently diagnosed with atrial fibrillation with RVR, he has had some history of this in the past.  His metoprolol was increased and he was  restarted on a blood thinner.  He Martínez presents today with lightheadedness symptoms.  He arrives to the emergency department tachycardic with an EKG consistent with atrial flutter with RVR, heart rates initially in the 130s most consistently.  Workup was completed as below.  This did reveal some evidence of hypocalcemia although his ionized calcium is within normal limits, this is a change from previous and further workup was ordered.  Ultimately patient responded well to a bolus of IV diltiazem with a heart rate consistently improved to the 70s after this.  Given his need for both CCB and metoprolol therapy, persistent symptoms despite rate control, new electrolyte derangements, we discussed admitting him to the hospital after reviewing case with cardiology.  Agree with remainder of ED course and plan as documented by МАРИНА.        1. Atrial flutter with rapid ventricular response (H)        Labs and Imaging:  Results for orders placed or performed during the hospital encounter of 06/24/24   XR Knee Right 3 Views    Impression    IMPRESSION: Mild degenerative changes. No acute fracture or joint effusion.   Basic metabolic panel   Result Value Ref Range    Sodium 140 135 - 145 mmol/L    Potassium 3.3 (L) 3.4 - 5.3 mmol/L    Chloride 108 (H) 98 - 107 mmol/L    Carbon Dioxide (CO2) 22 22 - 29 mmol/L    Anion Gap 10 7 - 15 mmol/L    Urea Nitrogen 9.9 6.0 - 20.0 mg/dL    Creatinine 0.74 0.67 - 1.17 mg/dL    GFR Estimate >90 >60 mL/min/1.73m2    Calcium 6.0 (L) 8.6 - 10.0 mg/dL    Glucose 142 (H) 70 - 99 mg/dL   Result Value Ref Range    Magnesium 2.3 1.7 - 2.3 mg/dL   Result Value Ref Range    Troponin T, High Sensitivity <6 <=22 ng/L   TSH with free T4 reflex   Result Value Ref Range    TSH 1.47 0.30 - 4.20 uIU/mL   UA with Microscopic reflex to Culture    Specimen: Urine, Midstream   Result Value Ref Range    Color Urine Yellow Colorless, Straw, Light Yellow, Yellow    Appearance Urine Clear Clear    Glucose Urine  Negative Negative mg/dL    Bilirubin Urine Negative Negative    Ketones Urine 20 (A) Negative mg/dL    Specific Gravity Urine 1.035 (H) 1.001 - 1.030    Blood Urine 0.03 mg/dL (A) Negative    pH Urine 6.0 5.0 - 7.0    Protein Albumin Urine 70 (A) Negative mg/dL    Urobilinogen Urine <2.0 <2.0 mg/dL    Nitrite Urine Negative Negative    Leukocyte Esterase Urine Negative Negative    Mucus Urine Present (A) None Seen /LPF    RBC Urine 2 <=2 /HPF    WBC Urine 3 <=5 /HPF    Squamous Epithelials Urine <1 <=1 /HPF   CBC with platelets and differential   Result Value Ref Range    WBC Count 5.0 4.0 - 11.0 10e3/uL    RBC Count 6.42 (H) 4.40 - 5.90 10e6/uL    Hemoglobin 17.7 13.3 - 17.7 g/dL    Hematocrit 53.2 (H) 40.0 - 53.0 %    MCV 83 78 - 100 fL    MCH 27.6 26.5 - 33.0 pg    MCHC 33.3 31.5 - 36.5 g/dL    RDW 12.2 10.0 - 15.0 %    Platelet Count 258 150 - 450 10e3/uL    % Neutrophils 58 %    % Lymphocytes 31 %    % Monocytes 10 %    % Eosinophils 0 %    % Basophils 1 %    % Immature Granulocytes 0 %    NRBCs per 100 WBC 0 <1 /100    Absolute Neutrophils 2.9 1.6 - 8.3 10e3/uL    Absolute Lymphocytes 1.6 0.8 - 5.3 10e3/uL    Absolute Monocytes 0.5 0.0 - 1.3 10e3/uL    Absolute Eosinophils 0.0 0.0 - 0.7 10e3/uL    Absolute Basophils 0.0 0.0 - 0.2 10e3/uL    Absolute Immature Granulocytes 0.0 <=0.4 10e3/uL    Absolute NRBCs 0.0 10e3/uL   Result Value Ref Range    Phosphorus 2.5 2.5 - 4.5 mg/dL   Ionized Calcium   Result Value Ref Range    Calcium Ionized Whole Blood 4.6 4.4 - 5.2 mg/dL   Result Value Ref Range    CK 86 39 - 308 U/L   Hepatic panel   Result Value Ref Range    Protein Total 4.9 (L) 6.4 - 8.3 g/dL    Albumin 3.1 (L) 3.5 - 5.2 g/dL    Bilirubin Total 1.1 <=1.2 mg/dL    Alkaline Phosphatase 57 40 - 150 U/L    AST 25 0 - 45 U/L    ALT 14 0 - 70 U/L    Bilirubin Direct <0.20 0.00 - 0.30 mg/dL     Procedures:  I was present for the key portions of procedures documented in МАРИНА/midlevel note, see midlevel note for  further details.    Jose Farley MD   [facility] EMERGENCY DEPARTMENT  [facility] [facility] [id number] [phone]     Damir Farley MD  06/24/24 1430

## 2024-06-24 NOTE — ED TRIAGE NOTES
Patient arrives with ongoing generalized weakness, dizziness, fatigue, left lower back\flank pain. Was here Saturday with same symptoms which have not improved or worsened, was started on BP medications which he took this morning. Tylenol around 0800 with some relief. Pain comes and goes positionally.

## 2024-06-25 ENCOUNTER — ANESTHESIA (OUTPATIENT)
Dept: MEDSURG UNIT | Facility: HOSPITAL | Age: 54
End: 2024-06-25
Payer: COMMERCIAL

## 2024-06-25 ENCOUNTER — ANESTHESIA EVENT (OUTPATIENT)
Dept: MEDSURG UNIT | Facility: HOSPITAL | Age: 54
End: 2024-06-25
Payer: COMMERCIAL

## 2024-06-25 VITALS
RESPIRATION RATE: 18 BRPM | HEIGHT: 67 IN | OXYGEN SATURATION: 97 % | TEMPERATURE: 98.2 F | WEIGHT: 243.8 LBS | HEART RATE: 56 BPM | DIASTOLIC BLOOD PRESSURE: 70 MMHG | BODY MASS INDEX: 38.27 KG/M2 | SYSTOLIC BLOOD PRESSURE: 126 MMHG

## 2024-06-25 LAB
GLUCOSE BLDC GLUCOMTR-MCNC: 108 MG/DL (ref 70–99)
GLUCOSE BLDC GLUCOMTR-MCNC: 124 MG/DL (ref 70–99)
GLUCOSE BLDC GLUCOMTR-MCNC: 143 MG/DL (ref 70–99)
GLUCOSE BLDC GLUCOMTR-MCNC: 156 MG/DL (ref 70–99)
GLUCOSE BLDC GLUCOMTR-MCNC: 159 MG/DL (ref 70–99)
POTASSIUM SERPL-SCNC: 4.3 MMOL/L (ref 3.4–5.3)
TROPONIN T SERPL HS-MCNC: <6 NG/L

## 2024-06-25 PROCEDURE — 82962 GLUCOSE BLOOD TEST: CPT

## 2024-06-25 PROCEDURE — 99244 OFF/OP CNSLTJ NEW/EST MOD 40: CPT | Performed by: STUDENT IN AN ORGANIZED HEALTH CARE EDUCATION/TRAINING PROGRAM

## 2024-06-25 PROCEDURE — 99239 HOSP IP/OBS DSCHRG MGMT >30: CPT | Performed by: INTERNAL MEDICINE

## 2024-06-25 PROCEDURE — 84484 ASSAY OF TROPONIN QUANT: CPT | Performed by: INTERNAL MEDICINE

## 2024-06-25 PROCEDURE — 99207 PR NO CHARGE LOS: CPT | Performed by: INTERNAL MEDICINE

## 2024-06-25 PROCEDURE — 999N000141 HC STATISTIC PRE-PROCEDURE NURSING ASSESSMENT: Performed by: INTERNAL MEDICINE

## 2024-06-25 PROCEDURE — 93005 ELECTROCARDIOGRAM TRACING: CPT | Performed by: INTERNAL MEDICINE

## 2024-06-25 PROCEDURE — 250N000013 HC RX MED GY IP 250 OP 250 PS 637: Performed by: HOSPITALIST

## 2024-06-25 PROCEDURE — 36415 COLL VENOUS BLD VENIPUNCTURE: CPT | Performed by: INTERNAL MEDICINE

## 2024-06-25 PROCEDURE — G0378 HOSPITAL OBSERVATION PER HR: HCPCS

## 2024-06-25 RX ORDER — DEXAMETHASONE SODIUM PHOSPHATE 10 MG/ML
4 INJECTION, SOLUTION INTRAMUSCULAR; INTRAVENOUS
Status: DISCONTINUED | OUTPATIENT
Start: 2024-06-25 | End: 2024-06-25 | Stop reason: HOSPADM

## 2024-06-25 RX ORDER — LIDOCAINE 40 MG/G
CREAM TOPICAL
Status: CANCELLED | OUTPATIENT
Start: 2024-06-25

## 2024-06-25 RX ORDER — LIDOCAINE 40 MG/G
CREAM TOPICAL
OUTPATIENT
Start: 2024-06-25

## 2024-06-25 RX ORDER — DILTIAZEM HYDROCHLORIDE 120 MG/1
120 CAPSULE, EXTENDED RELEASE ORAL DAILY
Qty: 30 CAPSULE | Refills: 3 | Status: SHIPPED | OUTPATIENT
Start: 2024-06-25

## 2024-06-25 RX ORDER — ONDANSETRON 2 MG/ML
4 INJECTION INTRAMUSCULAR; INTRAVENOUS EVERY 30 MIN PRN
Status: DISCONTINUED | OUTPATIENT
Start: 2024-06-25 | End: 2024-06-25 | Stop reason: HOSPADM

## 2024-06-25 RX ORDER — SODIUM CHLORIDE, SODIUM LACTATE, POTASSIUM CHLORIDE, CALCIUM CHLORIDE 600; 310; 30; 20 MG/100ML; MG/100ML; MG/100ML; MG/100ML
INJECTION, SOLUTION INTRAVENOUS CONTINUOUS
Status: CANCELLED | OUTPATIENT
Start: 2024-06-25

## 2024-06-25 RX ORDER — OXYCODONE HYDROCHLORIDE 5 MG/1
10 TABLET ORAL
Status: DISCONTINUED | OUTPATIENT
Start: 2024-06-25 | End: 2024-06-25 | Stop reason: HOSPADM

## 2024-06-25 RX ORDER — POTASSIUM CHLORIDE 1500 MG/1
40 TABLET, EXTENDED RELEASE ORAL
OUTPATIENT
Start: 2024-06-25

## 2024-06-25 RX ORDER — POTASSIUM CHLORIDE 1500 MG/1
20 TABLET, EXTENDED RELEASE ORAL
OUTPATIENT
Start: 2024-06-25

## 2024-06-25 RX ORDER — MAGNESIUM SULFATE HEPTAHYDRATE 40 MG/ML
2 INJECTION, SOLUTION INTRAVENOUS
OUTPATIENT
Start: 2024-06-25

## 2024-06-25 RX ORDER — FENTANYL CITRATE 50 UG/ML
25 INJECTION, SOLUTION INTRAMUSCULAR; INTRAVENOUS EVERY 5 MIN PRN
Status: DISCONTINUED | OUTPATIENT
Start: 2024-06-25 | End: 2024-06-25 | Stop reason: HOSPADM

## 2024-06-25 RX ORDER — OXYCODONE HYDROCHLORIDE 5 MG/1
5 TABLET ORAL
Status: DISCONTINUED | OUTPATIENT
Start: 2024-06-25 | End: 2024-06-25 | Stop reason: HOSPADM

## 2024-06-25 RX ORDER — NALOXONE HYDROCHLORIDE 0.4 MG/ML
0.1 INJECTION, SOLUTION INTRAMUSCULAR; INTRAVENOUS; SUBCUTANEOUS
Status: DISCONTINUED | OUTPATIENT
Start: 2024-06-25 | End: 2024-06-25 | Stop reason: HOSPADM

## 2024-06-25 RX ORDER — ONDANSETRON 4 MG/1
4 TABLET, ORALLY DISINTEGRATING ORAL EVERY 30 MIN PRN
Status: DISCONTINUED | OUTPATIENT
Start: 2024-06-25 | End: 2024-06-25 | Stop reason: HOSPADM

## 2024-06-25 RX ORDER — FENTANYL CITRATE 50 UG/ML
50 INJECTION, SOLUTION INTRAMUSCULAR; INTRAVENOUS EVERY 5 MIN PRN
Status: DISCONTINUED | OUTPATIENT
Start: 2024-06-25 | End: 2024-06-25 | Stop reason: HOSPADM

## 2024-06-25 RX ORDER — SODIUM CHLORIDE, SODIUM LACTATE, POTASSIUM CHLORIDE, CALCIUM CHLORIDE 600; 310; 30; 20 MG/100ML; MG/100ML; MG/100ML; MG/100ML
INJECTION, SOLUTION INTRAVENOUS CONTINUOUS
Status: DISCONTINUED | OUTPATIENT
Start: 2024-06-25 | End: 2024-06-25 | Stop reason: HOSPADM

## 2024-06-25 RX ADMIN — POLYETHYLENE GLYCOL 3350 17 G: 17 POWDER, FOR SOLUTION ORAL at 08:42

## 2024-06-25 RX ADMIN — DICLOFENAC 4 G: 10 GEL TOPICAL at 08:41

## 2024-06-25 RX ADMIN — INSULIN ASPART 1 UNITS: 100 INJECTION, SOLUTION INTRAVENOUS; SUBCUTANEOUS at 08:39

## 2024-06-25 RX ADMIN — METOPROLOL TARTRATE 100 MG: 25 TABLET, FILM COATED ORAL at 08:41

## 2024-06-25 RX ADMIN — APIXABAN 5 MG: 5 TABLET, FILM COATED ORAL at 08:45

## 2024-06-25 RX ADMIN — METFORMIN ER 500 MG 500 MG: 500 TABLET ORAL at 08:41

## 2024-06-25 RX ADMIN — DILTIAZEM HYDROCHLORIDE 30 MG: 30 TABLET, FILM COATED ORAL at 05:11

## 2024-06-25 RX ADMIN — INSULIN ASPART 1 UNITS: 100 INJECTION, SOLUTION INTRAVENOUS; SUBCUTANEOUS at 12:21

## 2024-06-25 RX ADMIN — DICLOFENAC 4 G: 10 GEL TOPICAL at 12:21

## 2024-06-25 RX ADMIN — DILTIAZEM HYDROCHLORIDE 30 MG: 30 TABLET, FILM COATED ORAL at 12:21

## 2024-06-25 RX ADMIN — DICLOFENAC 4 G: 10 GEL TOPICAL at 17:05

## 2024-06-25 ASSESSMENT — ACTIVITIES OF DAILY LIVING (ADL)
ADLS_ACUITY_SCORE: 31
DEPENDENT_IADLS:: INDEPENDENT
ADLS_ACUITY_SCORE: 31

## 2024-06-25 ASSESSMENT — ENCOUNTER SYMPTOMS: DYSRHYTHMIAS: 1

## 2024-06-25 NOTE — CONSULTS
Care Management Initial Consult    General Information  Assessment completed with: Patient, pt  Type of CM/SW Visit: Initial Assessment    Primary Care Provider verified and updated as needed: Yes   Readmission within the last 30 days: no previous admission in last 30 days      Reason for Consult: discharge planning, length of stay  Advance Care Planning: Advance Care Planning Reviewed: no concerns identified, verified with patient     General Information Comments: lives w/uncle and no svcs, independent at baseline    Communication Assessment  Patient's communication style: spoken language (English or Bilingual)    Hearing Difficulty or Deaf: no        Cognitive  Cognitive/Neuro/Behavioral: WDL                      Living Environment:   People in home: other (see comments) (uncle)     Current living Arrangements: house      Able to return to prior arrangements: yes       Family/Social Support:  Care provided by: self  Provides care for: no one  Marital Status: Single  Significant Other          Description of Support System: Supportive, Involved    Support Assessment: Adequate family and caregiver support, Adequate social supports    Current Resources:   Patient receiving home care services: No     Community Resources: None  Equipment currently used at home: none  Supplies currently used at home: None    Employment/Financial:  Employment Status:          Financial Concerns: none   Referral to Financial Worker: No       Does the patient's insurance plan have a 3 day qualifying hospital stay waiver?  No    Lifestyle & Psychosocial Needs:  Social Determinants of Health     Food Insecurity: Not on file   Depression: Not on file   Housing Stability: Not on file   Tobacco Use: Medium Risk (6/22/2024)    Patient History     Smoking Tobacco Use: Former     Smokeless Tobacco Use: Never     Passive Exposure: Not on file   Financial Resource Strain: Not on file   Alcohol Use: Not on file   Transportation Needs: Not on file    Physical Activity: Not on file   Interpersonal Safety: Not on file   Stress: Not on file   Social Connections: Not on file   Health Literacy: Not on file       Functional Status:  Prior to admission patient needed assistance:   Dependent ADLs:: Independent  Dependent IADLs:: Independent  Assesssment of Functional Status: Not at baseline with ADL Functioning    Mental Health Status:  Mental Health Status: No Current Concerns       Chemical Dependency Status:                Values/Beliefs:  Spiritual, Cultural Beliefs, Taoism Practices, Values that affect care:                 Additional Information:  Assessed, lives w/uncle, independent and no svcs, declined needs at this time. May need CD treatment resources at discharge. Discharge pending Cardiology consult and RADHA.    Zeinab Madden RN

## 2024-06-25 NOTE — PROGRESS NOTES
"PRIMARY DIAGNOSIS: \"GENERIC\" NURSING  OUTPATIENT/OBSERVATION GOALS TO BE MET BEFORE DISCHARGE:  ADLs back to baseline: Yes    Activity and level of assistance: Ambulating independently.    Pain status: Improved-controlled with oral pain medications.    Return to near baseline physical activity: Yes     Discharge Planner Nurse   Safe discharge environment identified: Yes  Barriers to discharge: Yes    "

## 2024-06-25 NOTE — ANESTHESIA PREPROCEDURE EVALUATION
Anesthesia Pre-Procedure Evaluation    Patient: Alfredo Du   MRN: [id number] : 1970        Procedure : Procedure(s):  ECHOCARDIOGRAM, TRANSESOPHAGEAL, WITH ANESTHESIA  ANESTHESIA, FOR CARDIOVERSION          History reviewed. No pertinent past medical history.   History reviewed. No pertinent surgical history.   Allergies   Allergen Reactions    Contrast Dye Shortness Of Breath    Iodinated Contrast Media Shortness Of Breath and Difficulty breathing      Social History     Tobacco Use    Smoking status: Former    Smokeless tobacco: Never   Substance Use Topics    Alcohol use: No      Wt Readings from Last 1 Encounters:   24 110.6 kg (243 lb 12.8 oz)        Anesthesia Evaluation   Pt has had prior anesthetic.         ROS/MED HX  ENT/Pulmonary:     (+) sleep apnea, moderate, uses CPAP,                                      Neurologic:  - neg neurologic ROS     Cardiovascular:     (+) Dyslipidemia hypertension- -   -  - -                        dysrhythmias, a-fib,          (-) CAD and CHF   METS/Exercise Tolerance:     Hematologic:  - neg hematologic  ROS     Musculoskeletal:  - neg musculoskeletal ROS     GI/Hepatic:  - neg GI/hepatic ROS  (-) GERD, hiatal hernia and esophageal disease   Renal/Genitourinary:  - neg Renal ROS     Endo:     (+)  type II DM,             Obesity,       Psychiatric/Substance Use:     (+)     Recreational drug usage: Cocaine.    Infectious Disease:       Malignancy:       Other:            Physical Exam    Airway        Mallampati: III   TM distance: > 3 FB   Neck ROM: full     Respiratory Devices and Support         Dental       (+) Modest Abnormalities - crowns, retainers, 1 or 2 missing teeth      Cardiovascular          Rhythm and rate: irregular and normal     Pulmonary   pulmonary exam normal                OUTSIDE LABS:  CBC:   Lab Results   Component Value Date    WBC 5.0 2024    WBC 5.1 2024    HGB 17.7 2024    HGB 16.7 2024    HCT 53.2  "(H) 06/24/2024    HCT 50.1 06/22/2024     06/24/2024     06/22/2024     BMP:   Lab Results   Component Value Date     06/24/2024     06/22/2024    POTASSIUM 4.3 06/24/2024    POTASSIUM 3.3 (L) 06/24/2024    CHLORIDE 108 (H) 06/24/2024    CHLORIDE 97 (L) 06/22/2024    CO2 22 06/24/2024    CO2 30 (H) 06/22/2024    BUN 9.9 06/24/2024    BUN 11.9 06/22/2024    CR 0.74 06/24/2024    CR 0.98 06/22/2024     (H) 06/25/2024     (H) 06/25/2024     COAGS: No results found for: \"PTT\", \"INR\", \"FIBR\"  POC: No results found for: \"BGM\", \"HCG\", \"HCGS\"  HEPATIC:   Lab Results   Component Value Date    ALBUMIN 3.1 (L) 06/24/2024    PROTTOTAL 4.9 (L) 06/24/2024    ALT 14 06/24/2024    AST 25 06/24/2024    ALKPHOS 57 06/24/2024    BILITOTAL 1.1 06/24/2024     OTHER:   Lab Results   Component Value Date    AYE 6.0 (L) 06/24/2024    PHOS 2.5 06/24/2024    MAG 2.3 06/24/2024    TSH 1.47 06/24/2024       Anesthesia Plan    ASA Status:  3    NPO Status:  NPO Appropriate    Anesthesia Type: MAC.   Induction: Intravenous, Propofol.   Maintenance: TIVA.        Consents    Anesthesia Plan(s) and associated risks, benefits, and realistic alternatives discussed. Questions answered and patient/representative(s) expressed understanding.     - Discussed:     - Discussed with:  Patient            Postoperative Care    Pain management: IV analgesics.   PONV prophylaxis: Ondansetron (or other 5HT-3), Background Propofol Infusion     Comments:               Olga Bailye MD    I have reviewed the pertinent notes and labs in the chart from the past 30 days and (re)examined the patient.  Any updates or changes from those notes are reflected in this note.    # Hypokalemia: Lowest K = 3.3 mmol/L in last 2 days, will replace as needed       # Hypoalbuminemia: Lowest albumin = 3.1 g/dL at 6/24/2024  1:06 PM, will monitor as appropriate   # Drug Induced Coagulation Defect: home medication list includes an " "anticoagulant medication   # Obesity: Estimated body mass index is 38.18 kg/m  as calculated from the following:    Height as of this encounter: 1.702 m (5' 7\").    Weight as of this encounter: 110.6 kg (243 lb 12.8 oz).      "

## 2024-06-25 NOTE — PROGRESS NOTES
PRIMARY DIAGNOSIS: GENERALIZED WEAKNESS    OUTPATIENT/OBSERVATION GOALS TO BE MET BEFORE DISCHARGE  1. Orthostatic performed: No    2. Tolerating PO medications: Yes    3. Return to near baseline physical activity: No    4. Cleared for discharge by consultants (if involved): No    Discharge Planner Nurse   Safe discharge environment identified: Yes  Barriers to discharge: Yes       Entered by: Clinton Jeong RN 06/25/2024 2:39 PM     Please review provider order for any additional goals.   Nurse to notify provider when observation goals have been met and patient is ready for discharge.

## 2024-06-25 NOTE — CONSULTS
[facility]   [address], [place] [address]   [email or web address]   OFFICE: [phone]     CARDIOLOGY INPATIENT CONSULT NOTE     Impression and Plan     Assessment/Plan:  Mr. Alfredo Du is a 54 year old male with HTN, DM, paroxysmal A-fib, cocaine abuse, who presented to the hospital with flank pain and was found to be in afib/flutter.      Paroxysmal/persistent afib/flutter   - CHADS-Vasc 2 for HTN and DM.  Continue Eliquis 5mg BID   - Plan on RADHA/DCCV    - Discussed importance of continuing eliquis indefinitely but especially for the next 30 days post cardioversion   - Cont metoprolol and diltiazem    HTN   - Well controlled   - Continue current management, holding home hydrochlorothiazide    Primary Cardiologist: None    History of Present Illness      Mr. Alfredo Du is a 54 year old male with HTN, DM, paroxysmal A-fib, cocaine abuse, who presented to the hospital with flank pain and was found to be in afib/flutter.  The patient initially presented to the ED on 6/22 and rhythm was uncontrolled afib.  He presented for his flank pain but had been experiencing palpitations and lightheadedness.  His metoprolol was increased and he was sent home.  He presented 2 days later with similar chest symptoms but worsening flank pain.  Rhythm then revealed flutter.  He has a history of afib but it does not seem he was following with a cardiologist.  He notes episodes of palpitations intermittently in the past.        Review of Systems:  Further review of systems is otherwise negative/noncontributory (based on review of medical record (admission H&P) and 13 point review of systems reviewed. Pertinent positives noted).    Cardiac Diagnostics     ECG: Personally reviewed and interpreted: 6/25/2024  Aflutter with variable AVB  RBBB    Telemetry (personally reviewed): afl        Medical History  Surgical History Family History Social History   No past medical  "history on file.  No past surgical history on file.  No family history on file.        Social History     Socioeconomic History    Marital status:      Spouse name: Not on file    Number of children: Not on file    Years of education: Not on file    Highest education level: Not on file   Occupational History    Not on file   Tobacco Use    Smoking status: Former    Smokeless tobacco: Never   Substance and Sexual Activity    Alcohol use: No    Drug use: Yes     Types: Cocaine    Sexual activity: Not on file   Other Topics Concern    Not on file   Social History Narrative    Not on file     Social Determinants of Health     Financial Resource Strain: Not on file   Food Insecurity: Not on file   Transportation Needs: Not on file   Physical Activity: Not on file   Stress: Not on file   Social Connections: Not on file   Interpersonal Safety: Not on file   Housing Stability: Not on file             Physical Examination   VITALS: /65 (BP Location: Left arm)   Pulse 103   Temp 98.5  F (36.9  C) (Oral)   Resp 18   Ht 1.702 m (5' 7\")   Wt 110.6 kg (243 lb 12.8 oz)   SpO2 93%   BMI 38.18 kg/m    BMI: Body mass index is 38.18 kg/m .  Wt Readings from Last 3 Encounters:   06/24/24 110.6 kg (243 lb 12.8 oz)   06/22/24 112 kg (247 lb)   08/11/22 111.1 kg (245 lb)     No intake or output data in the 24 hours ending 06/25/24 1133    General: pleasant male. No acute distress.   HENT: external ears normal. Nares patent. Mucous membranes moist.  Neck: No JVD  Lungs: clear to auscultation  COR: irregularly irregular rate and rhythm, No murmurs, rubs, or gallops  Abd: nondistended, BS present  Extrem: No edema        Lab Results Reviewed    Chemistry/lipid CBC Cardiac Enzymes/BNP/TSH/INR   No results for input(s): \"CHOL\", \"HDL\", \"LDL\", \"TRIG\", \"CHOLHDLRATIO\" in the last 33523 hours.  No results for input(s): \"LDL\" in the last 37045 hours.  Recent Labs   Lab Test 06/25/24  0742 06/25/24  0205 06/24/24  2332 " "06/24/24  1903 06/24/24  1306   NA  --   --   --   --  140   POTASSIUM  --   --  4.3  --  3.3*   CHLORIDE  --   --   --   --  108*   CO2  --   --   --   --  22   *   < >  --    < > 142*   BUN  --   --   --   --  9.9   CR  --   --   --   --  0.74   GFRESTIMATED  --   --   --   --  >90   AYE  --   --   --   --  6.0*    < > = values in this interval not displayed.     Recent Labs   Lab Test 06/24/24  1306 06/22/24  1132   CR 0.74 0.98     No results for input(s): \"A1C\" in the last 98424 hours.       Recent Labs   Lab Test 06/24/24  1236   WBC 5.0   HGB 17.7   HCT 53.2*   MCV 83        Recent Labs   Lab Test 06/24/24  1236 06/22/24  1132   HGB 17.7 16.7    No results for input(s): \"TROPONINI\" in the last 92157 hours.  No results for input(s): \"BNP\", \"NTBNPI\", \"NTBNP\" in the last 28097 hours.  Recent Labs   Lab Test 06/24/24  1236   TSH 1.47     No results for input(s): \"INR\" in the last 54982 hours.        Current Inpatient Scheduled Medications   Scheduled Meds:  Current Facility-Administered Medications   Medication Dose Route Frequency Provider Last Rate Last Admin    apixaban ANTICOAGULANT (ELIQUIS) tablet 5 mg  5 mg Oral BID Susan Aden MD   5 mg at 06/25/24 0845    atorvastatin (LIPITOR) tablet 20 mg  20 mg Oral At Bedtime Susan Aden MD   20 mg at 06/24/24 2211    diclofenac (VOLTAREN) 1 % topical gel 4 g  4 g Topical 4x Daily Susan Aden MD   4 g at 06/25/24 0841    diltiazem (CARDIZEM) tablet 30 mg  30 mg Oral Q6H [facility] Susan Aden MD   30 mg at 06/25/24 0511    insulin aspart (NovoLOG) injection (RAPID ACTING)  1-7 Units Subcutaneous TID AC Susan Aden MD   1 Units at 06/25/24 0839    metFORMIN (GLUCOPHAGE XR) 24 hr tablet 500 mg  500 mg Oral BID w/meals Susan Aden MD   500 mg at 06/25/24 0841    metoprolol tartrate (LOPRESSOR) tablet 100 mg  100 mg Oral BID Susan Aden MD   100 mg at 06/25/24 0841    polyethylene glycol (MIRALAX) Packet 17 g  17 g Oral Daily Susan Aden MD "   17 g at 06/25/24 0842     Continuous Infusions:  Current Facility-Administered Medications   Medication Dose Route Frequency Provider Last Rate Last Admin    Patient is already receiving anticoagulation with heparin, enoxaparin (LOVENOX), warfarin (COUMADIN)  or other anticoagulant medication   Does not apply Continuous PRN Susan Aden MD           No current outpatient medications on file.          Medications Prior to Admission   Prior to Admission medications    Medication Sig Start Date End Date Taking? Authorizing Provider   acetaminophen (TYLENOL) 500 MG tablet Take 500-1,000 mg by mouth every 6 hours as needed 3/18/24  Yes Reported, Patient   apixaban ANTICOAGULANT (ELIQUIS ANTICOAGULANT) 5 MG tablet Take 1 tablet (5 mg) by mouth 2 times daily for 30 days 6/22/24 7/22/24 Yes Suad Mcdermott MD   atorvastatin (LIPITOR) 20 MG tablet [ATORVASTATIN (LIPITOR) 20 MG TABLET] Take 20 mg by mouth at bedtime. 3/21/19  Yes Provider, Historical   cyclobenzaprine (FLEXERIL) 10 MG tablet Take 10 mg by mouth 3 times daily as needed 4/1/24  Yes Reported, Patient   DAILY-MARCIA tablet [DAILY-MARCIA TABLET] Take 1 tablet by mouth daily. 11/2/15  Yes Provider, Historical   diclofenac (VOLTAREN) 1 % topical gel Apply 4 g topically 4 times daily 6/22/24  Yes Suad Mcdermott MD   fluticasone (FLONASE) 50 MCG/ACT nasal spray Spray 1 spray into both nostrils 2 times daily as needed for rhinitis or allergies   Yes Reported, Patient   hydrochlorothiazide (HYDRODIURIL) 25 MG tablet [HYDROCHLOROTHIAZIDE (HYDRODIURIL) 25 MG TABLET] Take 25 mg by mouth daily.  6/25/14  Yes Provider, Historical   ibuprofen (ADVIL,MOTRIN) 600 MG tablet [IBUPROFEN (ADVIL,MOTRIN) 600 MG TABLET] as needed.  6/25/14  Yes Provider, Historical   metFORMIN (GLUCOPHAGE XR) 500 MG 24 hr tablet Take 500 mg by mouth 2 times daily (with meals)   Yes Reported, Patient   metoprolol tartrate (LOPRESSOR) 100 MG tablet Take 1 tablet (100 mg) by mouth 2 times daily for  "30 days 6/22/24 7/22/24 Yes Suad Mcdermott MD   minocycline (MINOCIN,DYNACIN) 100 MG capsule [MINOCYCLINE (MINOCIN,DYNACIN) 100 MG CAPSULE] Take 100 mg by mouth 2 (two) times a day. 3/21/19  Yes Provider, Historical   sildenafil (VIAGRA) 100 MG tablet Take  mg by mouth daily as needed 6/6/24  Yes Reported, Patient          Don RoachDO [facility]  Non-invasive Cardiologist  [facility]      Clinically Significant Risk Factors Present on Admission        # Hypokalemia: Lowest K = 3.3 mmol/L in last 2 days, will replace as needed       # Hypoalbuminemia: Lowest albumin = 3.1 g/dL at 6/24/2024  1:06 PM, will monitor as appropriate  # Drug Induced Coagulation Defect: home medication list includes an anticoagulant medication                # Obesity: Estimated body mass index is 38.18 kg/m  as calculated from the following:    Height as of this encounter: 1.702 m (5' 7\").    Weight as of this encounter: 110.6 kg (243 lb 12.8 oz).             "

## 2024-06-25 NOTE — PROGRESS NOTES
"PRIMARY DIAGNOSIS: \"GENERIC\" NURSING  OUTPATIENT/OBSERVATION GOALS TO BE MET BEFORE DISCHARGE:  ADLs back to baseline: Yes    Activity and level of assistance: Ambulating independently.    Pain status: Improved-controlled with oral pain medications.    Return to near baseline physical activity: Yes     Discharge Planner Nurse   Safe discharge environment identified: Yes  Barriers to discharge: Yes       Entered by: Isai Porras RN 06/24/2024 11:31 PM     A & O x 4. VSS except hypertensive on RA. Pain, nausea addressed with PRN medication. K protocol. , 166. Tele: atrial flutter. R PIV SL. Regular diet, appetite poor. NPO @ midnight. Up independently. Urinal within reach. Family stopped by for support. Nursing continue to monitor.  "

## 2024-06-25 NOTE — PROGRESS NOTES
During discussion of RADHA/cardioversion  risk, patient converted to sinus bradycardia 48 bpm.  No awareness of his cardioversion.  RADHA/cardioversion canceled.  Would proceed with transthoracic echo today.  Sivakumar Lopez MD

## 2024-06-25 NOTE — PLAN OF CARE
Problem: Adult Inpatient Plan of Care  Goal: Absence of Hospital-Acquired Illness or Injury  Intervention: Prevent Infection  Recent Flowsheet Documentation  Taken 6/25/2024 0840 by Clinton Ram RN  Infection Prevention: hand hygiene promoted     Problem: Adult Inpatient Plan of Care  Goal: Optimal Comfort and Wellbeing  Intervention: Monitor Pain and Promote Comfort  Recent Flowsheet Documentation  Taken 6/25/2024 0840 by Clinton Ram RN  Pain Management Interventions:   medication (see MAR)   emotional support     Problem: Dysrhythmia  Goal: Normalized Cardiac Rhythm  Outcome: Progressing    SUBJECTIVE:  Pt a/o with VSS. Expected to have a cardioversion and an echocardiogram @ 1500; remains NPO since midnight. Independent in the room.      Goal Outcome Evaluation:      Plan of Care Reviewed With: patient    Overall Patient Progress: improving    Clinton Jeong RN

## 2024-06-25 NOTE — DISCHARGE SUMMARY
"[facility]  Hospitalist Discharge Summary      Date of Admission:  6/24/2024  Date of Discharge:  6/25/2024  Discharging Provider: Torrey Ann MD  Discharge Service: Hospitalist Service    Discharge Diagnoses   Principal Problem:    Atrial flutter with rapid ventricular response (paroxysmal atrial fibrillation/flutter)  Active Problems:    Flank pain - presumably musculoskeletal    MIKE (obstructive sleep apnea)    Hypokalemia    Microscopic hematuria    Cocaine abuse    Essential Hypertension    Dyslipidemia    Borderline diabetes mellitus type 2      Clinically Significant Risk Factors     # Obesity: Estimated body mass index is 38.18 kg/m  as calculated from the following:    Height as of this encounter: 1.702 m (5' 7\").    Weight as of this encounter: 110.6 kg (243 lb 12.8 oz).       Follow-ups Needed After Discharge      Follow up with primary care provider, Shyla Milian, within 7 days for hospital follow- up.  No follow up labs or test are needed.  Check heart rate and blood pressure            Discharge Disposition   Discharged to home  Condition at discharge: Stable    Hospital Course   53 yo M with h/o borderline DM2 on metformin, HTN, HLD, MIKE, cocaine abuse, and paroxysmal atrial fib/flutter who was admitted with left flank pain and lightheadedness.   CT of abd done on an ED visit 2 days prior to this admission was unremarkable for stone or other cause for the flank pain though of note he does have microscopic hematuria that will need follow up and it is possible he had a passed stone.  He can follow up with his PCP on that and may need urology referral.    The lightheadedness was felt to be due to atrial flutter with rapid rate.  This is likely exacerbated by his cocaine use.  He was recently increased to maximum dose metoprolol a couple days prior to this admission because of the afib with RVR. IV diltiazem was added on admission this time and cardiology was consulted.  " Plan was for RADHA guided cardioversion the next morning after admission.  However, when he arrived at the procedural suite he was found to be back in sinus rhythm.  Cardiology then cleared him for discharge.  We have added oral diltiazem for better control of HR when in afib/flutter and stopped the hydrochlorothiazide in order to allow for the BP effects of diltiazem, and his BP and heart rate will need to be closely followed.  If he can stop the cocaine use likely his medications could be de-escalated. OF NOTE: cardiology is recommending he continue apixaban indefinitely.    Consultations This Hospital Stay   CARE MANAGEMENT / SOCIAL WORK IP CONSULT  CARDIOLOGY IP CONSULT    Code Status   Full Code    Time Spent on this Encounter   I, Torrey Ann MD, personally saw the patient today and spent greater than 30 minutes discharging this patient.       Torrey Ann MD  [facility] EXTENDED RECOVERY AND SHORT STAY  [address] [place] [id number]  Phone: [phone]  Fax: [phone]  ______________________________________________________________________    Physical Exam   Vital Signs: Temp: 98.2  F (36.8  C) Temp src: Oral BP: 126/70 Pulse: 56   Resp: 18 SpO2: 97 % O2 Device: Nasal cannula Oxygen Delivery: 2 LPM  Weight: 243 lbs 12.8 oz  The patient was interviewed and examined on the day of discharge.         Primary Care Physician   Shyla Milian    Discharge Orders      Reason for your hospital stay    Atrial flutter with rapid rate     Follow-up and recommended labs and tests     Follow up with primary care provider, Shyla Milian, within 7 days for hospital follow- up.  No follow up labs or test are needed.  Check heart rate and blood pressure     Activity    Your activity upon discharge: activity as tolerated     Diet    Follow this diet upon discharge: Orders Placed This Encounter   Diabetic diet       Significant Results and Procedures   Results for orders  placed or performed during the hospital encounter of 06/24/24   XR Knee Right 3 Views    Narrative    EXAM: XR KNEE RIGHT 3 VIEWS  LOCATION: [facility]  DATE: 6/24/2024    INDICATION: R knee injury and medial knee pain  COMPARISON: None.      Impression    IMPRESSION: Mild degenerative changes. No acute fracture or joint effusion.       Discharge Medications   Current Discharge Medication List        START taking these medications    Details   diltiazem ER (DILT-XR) 120 MG 24 hr capsule Take 1 capsule (120 mg) by mouth daily  Qty: 30 capsule, Refills: 3    Associated Diagnoses: Atrial flutter with rapid ventricular response (H)           CONTINUE these medications which have NOT CHANGED    Details   acetaminophen (TYLENOL) 500 MG tablet Take 500-1,000 mg by mouth every 6 hours as needed      apixaban ANTICOAGULANT (ELIQUIS ANTICOAGULANT) 5 MG tablet Take 1 tablet (5 mg) by mouth 2 times daily  Qty: 60 tablet, Refills: 0      atorvastatin (LIPITOR) 20 MG tablet [ATORVASTATIN (LIPITOR) 20 MG TABLET] Take 20 mg by mouth at bedtime.      cyclobenzaprine (FLEXERIL) 10 MG tablet Take 10 mg by mouth 3 times daily as needed      DAILY-MARCIA tablet [DAILY-MARCIA TABLET] Take 1 tablet by mouth daily.      diclofenac (VOLTAREN) 1 % topical gel Apply 4 g topically 4 times daily  Qty: 100 g, Refills: 0      fluticasone (FLONASE) 50 MCG/ACT nasal spray Spray 1 spray into both nostrils 2 times daily as needed for rhinitis or allergies      metFORMIN (GLUCOPHAGE XR) 500 MG 24 hr tablet Take 500 mg by mouth 2 times daily (with meals)      metoprolol tartrate (LOPRESSOR) 100 MG tablet Take 1 tablet (100 mg) by mouth 2 times daily for 30 days  Qty: 60 tablet, Refills: 0      minocycline (MINOCIN,DYNACIN) 100 MG capsule [MINOCYCLINE (MINOCIN,DYNACIN) 100 MG CAPSULE] Take 100 mg by mouth 2 (two) times a day.      sildenafil (VIAGRA) 100 MG tablet Take  mg by mouth daily as needed           STOP taking these  medications       hydrochlorothiazide (HYDRODIURIL) 25 MG tablet Comments:   Reason for Stopping:         ibuprofen (ADVIL,MOTRIN) 600 MG tablet Comments:   Reason for Stopping:             Allergies   Allergies   Allergen Reactions    Contrast Dye Shortness Of Breath    Iodinated Contrast Media Shortness Of Breath and Difficulty breathing

## 2024-06-25 NOTE — PROGRESS NOTES
Assumed care for this patient at 2300. Patient was laying in bed with eyes closed. Awoke to voice. Alert and oriented.  Denies pain. Plan for RADHA tomorrow. NPO at 0000. Patient is aware of plan.  Potassium protocol.  Last lab was 3.3. Lab draw now, waiting on results.  Tele monitoring. Aflutter with adequate ventricular response with PVC.  AC and HS blood glucose checks. Last blood glucose was 166 mg/dl.  Blood glucose will be spot check this shift at 0200.

## 2024-06-25 NOTE — PROGRESS NOTES
"PRIMARY DIAGNOSIS: \"GENERIC\" NURSING  OUTPATIENT/OBSERVATION GOALS TO BE MET BEFORE DISCHARGE:  ADLs back to baseline: No    Activity and level of assistance: Ambulating independently.    Pain status: Pain free.    Return to near baseline physical activity: No     Discharge Planner Nurse   Safe discharge environment identified: Yes  Barriers to discharge: Yes       Entered by: Clinton Jeong RN 06/25/2024 2:39 PM     Please review provider order for any additional goals.   Nurse to notify provider when observation goals have been met and patient is ready for discharge.  "

## 2024-06-25 NOTE — PLAN OF CARE
Problem: Adult Inpatient Plan of Care  Goal: Plan of Care Review  Description: The Plan of Care Review/Shift note should be completed every shift.  The Outcome Evaluation is a brief statement about your assessment that the patient is improving, declining, or no change.  This information will be displayed automatically on your shift  note.  Outcome: Progressing  Flowsheets (Taken 6/25/2024 9191)  Plan of Care Reviewed With: patient  Overall Patient Progress: improving   Goal Outcome Evaluation:      Plan of Care Reviewed With: patient    Overall Patient Progress: improvingOverall Patient Progress: improving

## 2024-06-25 NOTE — PLAN OF CARE
"PRIMARY DIAGNOSIS: \"GENERIC\" NURSING  OUTPATIENT/OBSERVATION GOALS TO BE MET BEFORE DISCHARGE:  ADLs back to baseline: No    Activity and level of assistance: Up with standby assistance.    Pain status: Pain free.    Return to near baseline physical activity: No     Discharge Planner Nurse   Safe discharge environment identified: Yes  Barriers to discharge: Yes       Entered by: Rach Yeung RN 06/25/2024 4:20 PM     Please review provider order for any additional goals.   Nurse to notify provider when observation goals have been met and patient is ready for discharge.Goal Outcome Evaluation:                        "

## 2024-06-25 NOTE — PLAN OF CARE
Observation goals  PRIOR TO DISCHARGE         Diagnostic tests and consults completed and resulted :  NOT MET. Cardiology and Case management consult pending. ECHO pending.     Vital signs normal or at patient baseline : MET.      Adequate pain control on oral analgesics : MET.     Dyspnea improved and O2 sats greater than 88% on room air or prior home oxygen levels :  MET    Nurse to notify provider when observation goals have been met and patient is ready for discharge.

## 2024-06-25 NOTE — DISCHARGE SUMMARY
Pt has been discharged home with all personal belongings. Discharged summary reviewed with Pt and Pt verbalized understanding.

## 2024-06-25 NOTE — OR NURSING
Patient was speaking with Dr. Lopez when cardiac monitor indicated Heart rhytm went into Sinus Kwaku/sinus rhythm,  procedure cancelled and patient returned to room Short stay 07

## 2024-06-26 LAB
ATRIAL RATE - MUSE: 315 BPM
DIASTOLIC BLOOD PRESSURE - MUSE: NORMAL MMHG
INTERPRETATION ECG - MUSE: NORMAL
P AXIS - MUSE: 260 DEGREES
PR INTERVAL - MUSE: NORMAL MS
QRS DURATION - MUSE: 122 MS
QT - MUSE: 386 MS
QTC - MUSE: 522 MS
R AXIS - MUSE: 9 DEGREES
SYSTOLIC BLOOD PRESSURE - MUSE: NORMAL MMHG
T AXIS - MUSE: 266 DEGREES
VENTRICULAR RATE- MUSE: 110 BPM

## 2024-06-26 PROCEDURE — 93010 ELECTROCARDIOGRAM REPORT: CPT | Mod: RTG | Performed by: INTERNAL MEDICINE

## 2024-06-27 ENCOUNTER — PATIENT OUTREACH (OUTPATIENT)
Dept: CARE COORDINATION | Facility: CLINIC | Age: 54
End: 2024-06-27
Payer: COMMERCIAL

## 2024-06-27 NOTE — PROGRESS NOTES
[facility]:   [facility] Contact  [facility]/Voicemail     Clinical Data: Post-Discharge Outreach     Outreach attempted x 2.  Left message on patient's voicemail, providing [facility]'s central phone number of [phone] ([phone]) for questions/concerns and/or to schedule an appt with an [facility] provider, if they do not have a PCP.      Plan:  [facility] will do no further outreaches at this time.       Dipti Carpenter  Community Health Worker  [facility], [facility]  [phone]      *Connected Care Resource Team does NOT follow patient ongoing. Referrals are identified based on internal discharge reports and the outreach is to ensure patient has an understanding of their discharge instructions.

## 2024-07-01 PROBLEM — F14.10 COCAINE ABUSE (H): Status: ACTIVE | Noted: 2024-07-01

## 2024-07-01 PROBLEM — R73.03 BORDERLINE TYPE 2 DIABETES MELLITUS: Status: ACTIVE | Noted: 2024-07-01

## 2024-07-01 PROBLEM — I10 ESSENTIAL HYPERTENSION: Status: ACTIVE | Noted: 2024-07-01

## 2024-07-01 PROBLEM — G47.33 OSA (OBSTRUCTIVE SLEEP APNEA): Status: ACTIVE | Noted: 2017-06-23

## 2024-07-01 PROBLEM — E78.5 DYSLIPIDEMIA: Status: ACTIVE | Noted: 2024-07-01

## 2024-07-09 LAB
ATRIAL RATE - MUSE: 300 BPM
DIASTOLIC BLOOD PRESSURE - MUSE: NORMAL MMHG
INTERPRETATION ECG - MUSE: NORMAL
P AXIS - MUSE: 264 DEGREES
PR INTERVAL - MUSE: NORMAL MS
QRS DURATION - MUSE: 116 MS
QT - MUSE: 376 MS
QTC - MUSE: 518 MS
R AXIS - MUSE: -2 DEGREES
SYSTOLIC BLOOD PRESSURE - MUSE: NORMAL MMHG
T AXIS - MUSE: 33 DEGREES
VENTRICULAR RATE- MUSE: 114 BPM

## 2024-07-10 LAB
ATRIAL RATE - MUSE: 117 BPM
DIASTOLIC BLOOD PRESSURE - MUSE: 63 MMHG
INTERPRETATION ECG - MUSE: NORMAL
P AXIS - MUSE: NORMAL DEGREES
PR INTERVAL - MUSE: NORMAL MS
QRS DURATION - MUSE: 86 MS
QT - MUSE: 366 MS
QTC - MUSE: 499 MS
R AXIS - MUSE: 37 DEGREES
SYSTOLIC BLOOD PRESSURE - MUSE: 128 MMHG
T AXIS - MUSE: 61 DEGREES
VENTRICULAR RATE- MUSE: 112 BPM

## (undated) DEVICE — SUCTION CANISTER MEDIVAC LINER 3000ML W/LID 65651-530